# Patient Record
Sex: FEMALE | Race: WHITE | NOT HISPANIC OR LATINO | Employment: OTHER | URBAN - METROPOLITAN AREA
[De-identification: names, ages, dates, MRNs, and addresses within clinical notes are randomized per-mention and may not be internally consistent; named-entity substitution may affect disease eponyms.]

---

## 2017-03-06 ENCOUNTER — MYC MEDICAL ADVICE (OUTPATIENT)
Dept: FAMILY MEDICINE | Facility: CLINIC | Age: 47
End: 2017-03-06

## 2017-03-07 ENCOUNTER — MYC MEDICAL ADVICE (OUTPATIENT)
Dept: FAMILY MEDICINE | Facility: CLINIC | Age: 47
End: 2017-03-07

## 2017-03-07 DIAGNOSIS — T78.40XA ALLERGIC REACTION, INITIAL ENCOUNTER: Primary | ICD-10-CM

## 2017-03-07 RX ORDER — ALBUTEROL SULFATE 90 UG/1
2 AEROSOL, METERED RESPIRATORY (INHALATION) EVERY 6 HOURS PRN
Qty: 1 INHALER | Refills: 0 | Status: SHIPPED | OUTPATIENT
Start: 2017-03-07 | End: 2022-07-19

## 2017-03-07 NOTE — TELEPHONE ENCOUNTER
Message handled by Nurse Triage with [facility] - provider name: АНДРЕЙ REED   Ok for albuterol inhaler if that is what is needed.    Nila Narvaez RN    [facility]  .

## 2017-06-24 ENCOUNTER — HEALTH MAINTENANCE LETTER (OUTPATIENT)
Age: 47
End: 2017-06-24

## 2018-06-30 ENCOUNTER — HEALTH MAINTENANCE LETTER (OUTPATIENT)
Age: 48
End: 2018-06-30

## 2019-01-21 ENCOUNTER — OFFICE VISIT (OUTPATIENT)
Dept: PEDIATRICS | Facility: CLINIC | Age: 49
End: 2019-01-21
Payer: COMMERCIAL

## 2019-01-21 VITALS
HEART RATE: 64 BPM | BODY MASS INDEX: 38.67 KG/M2 | RESPIRATION RATE: 14 BRPM | WEIGHT: 211.4 LBS | SYSTOLIC BLOOD PRESSURE: 126 MMHG | DIASTOLIC BLOOD PRESSURE: 92 MMHG | TEMPERATURE: 98 F

## 2019-01-21 DIAGNOSIS — H60.542 ECZEMATOID OTITIS EXTERNA OF LEFT EAR, UNSPECIFIED CHRONICITY: Primary | ICD-10-CM

## 2019-01-21 DIAGNOSIS — L30.9 DERMATITIS: ICD-10-CM

## 2019-01-21 PROCEDURE — 99213 OFFICE O/P EST LOW 20 MIN: CPT | Performed by: INTERNAL MEDICINE

## 2019-01-21 RX ORDER — HYDROCORTISONE 2.5 %
CREAM (GRAM) TOPICAL 2 TIMES DAILY PRN
Qty: 60 G | Refills: 3 | Status: SHIPPED | OUTPATIENT
Start: 2019-01-21 | End: 2021-09-14

## 2019-01-21 RX ORDER — NEOMYCIN SULFATE, POLYMYXIN B SULFATE, HYDROCORTISONE 3.5; 10000; 1 MG/ML; [USP'U]/ML; MG/ML
3 SOLUTION/ DROPS AURICULAR (OTIC) 4 TIMES DAILY
Qty: 5 ML | Refills: 1 | Status: SHIPPED | OUTPATIENT
Start: 2019-01-21 | End: 2019-01-28

## 2019-01-21 ASSESSMENT — PATIENT HEALTH QUESTIONNAIRE - PHQ9: SUM OF ALL RESPONSES TO PHQ QUESTIONS 1-9: 0

## 2019-01-21 NOTE — PROGRESS NOTES
SUBJECTIVE:   Susan Barr is a 48 year old female who presents to clinic today for the following health issues:    Skin rash near the ear canals, pruritis in the external ear canals    Duration: Two weeks. Feels very itchy    Description  ear pain bilateral    Severity: moderate    Accompanying signs and symptoms: rash on face, neck and chest    History (predisposing factors):  Pt just returned from trip x one week ago, which sx started prior    Precipitating or alleviating factors: None    Therapies tried and outcome:  Otc Hydrocortisone cream is mildly effective    Last week developed significant redness of the right pinna and noted an enlarged area inferior to the pinna. These sx are improved.  No fevers.    Notes that hearing seems somewhat muffled over the past few days.     Problem list and histories reviewed & adjusted, as indicated.      OBJECTIVE:     BP (!) 126/92   Pulse 64   Temp 98  F (36.7  C) (Oral)   Resp 14   Wt 95.9 kg (211 lb 6.4 oz)   LMP 03/09/2007   BMI 38.67 kg/m    Body mass index is 38.67 kg/m .  GEN: no distress  SKIN: flaking skin near the auditory meatus dao. Left mid canal w/ xerotic area. Slight erythema deep on the right ear canal.  HEENT: PERRL. No nasal discharge. OP moist. TM's clear dao. Small amount of serous fluid noted on the right.   NECK: Supple      ASSESSMENT/PLAN:       ICD-10-CM    1. Eczematoid otitis externa of left ear, unspecified chronicity H60.542 neomycin-polymyxin-hydrocortisone (CORTISPORIN) [id number] otic solution   2. Dermatitis L30.9 hydrocortisone 2.5 % cream     Clinically dermatitis which is causing her sx. May have had an infection on the right last week, but seems improved. Will try cortisporin for the HC portion of the medication. HC cream to the external areas.    Patient Instructions   Cortisporin otic drops   3 drops into either or both ears up to 4 times per day   Use for 1 week, then repeat in the future if needed    Hydrocortisone 2.5%  cream   Apply twice per day to affected areas     If your symptoms do not improve, consider seeing a dermatologist     Christian Medrano MD  [facility]

## 2019-01-21 NOTE — PATIENT INSTRUCTIONS
Cortisporin otic drops   3 drops into either or both ears up to 4 times per day   Use for 1 week, then repeat in the future if needed    Hydrocortisone 2.5% cream   Apply twice per day to affected areas     If your symptoms do not improve, consider seeing a dermatologist

## 2019-04-01 ENCOUNTER — TELEPHONE (OUTPATIENT)
Dept: OTHER | Facility: CLINIC | Age: 49
End: 2019-04-01

## 2019-09-07 ENCOUNTER — E-VISIT (OUTPATIENT)
Dept: FAMILY MEDICINE | Facility: CLINIC | Age: 49
End: 2019-09-07
Payer: COMMERCIAL

## 2019-09-07 DIAGNOSIS — J06.9 ACUTE URI: Primary | ICD-10-CM

## 2019-09-07 PROCEDURE — 99444 ZZC PHYSICIAN ONLINE EVALUATION & MANAGEMENT SERVICE: CPT | Performed by: FAMILY MEDICINE

## 2019-09-07 RX ORDER — AZITHROMYCIN 250 MG/1
TABLET, FILM COATED ORAL
Qty: 6 TABLET | Refills: 0 | Status: SHIPPED | OUTPATIENT
Start: 2019-09-07 | End: 2020-10-05

## 2019-12-16 ENCOUNTER — HEALTH MAINTENANCE LETTER (OUTPATIENT)
Age: 49
End: 2019-12-16

## 2020-02-13 ENCOUNTER — TELEPHONE (OUTPATIENT)
Dept: PEDIATRICS | Facility: CLINIC | Age: 50
End: 2020-02-13

## 2020-02-13 DIAGNOSIS — Z00.00 ROUTINE HISTORY AND PHYSICAL EXAMINATION OF ADULT: Primary | ICD-10-CM

## 2020-06-15 ENCOUNTER — MYC MEDICAL ADVICE (OUTPATIENT)
Dept: FAMILY MEDICINE | Facility: CLINIC | Age: 50
End: 2020-06-15

## 2020-10-05 ENCOUNTER — E-VISIT (OUTPATIENT)
Dept: FAMILY MEDICINE | Facility: CLINIC | Age: 50
End: 2020-10-05
Payer: COMMERCIAL

## 2020-10-05 DIAGNOSIS — G44.209 TENSION HEADACHE: Primary | ICD-10-CM

## 2020-10-05 PROCEDURE — 99207 PR NO CHARGE NURSE ONLY: CPT | Performed by: FAMILY MEDICINE

## 2020-10-05 NOTE — TELEPHONE ENCOUNTER
Called # [phone]     Pt called and advised OV, Pt stated she does have appt scheduled.      Deandre Mckeon RN   [facility]

## 2020-10-05 NOTE — PATIENT INSTRUCTIONS
Thank you for choosing us for your care. Based on your symptoms and length of illness, I do not think that you need a prescription at this time.  Please follow the care advise I've provided and use the over the counter medications to help relieve your symptoms.   View your full visit summary for details by clicking on the link below.     If you're not feeling better within 2-3 days, please respond to this message and we can consider if a prescription is needed.  You can schedule an appointment right here in [facility], or call [phone]  If the visit is for the same symptoms as your e-visit, we'll refund the cost of your e-visit if seen within seven days.

## 2020-10-12 ENCOUNTER — OFFICE VISIT (OUTPATIENT)
Dept: PEDIATRICS | Facility: CLINIC | Age: 50
End: 2020-10-12
Payer: COMMERCIAL

## 2020-10-12 VITALS
OXYGEN SATURATION: 99 % | HEIGHT: 62 IN | TEMPERATURE: 98 F | WEIGHT: 243.7 LBS | RESPIRATION RATE: 16 BRPM | SYSTOLIC BLOOD PRESSURE: 141 MMHG | HEART RATE: 66 BPM | DIASTOLIC BLOOD PRESSURE: 91 MMHG | BODY MASS INDEX: 44.85 KG/M2

## 2020-10-12 DIAGNOSIS — R03.0 ELEVATED BP WITHOUT DIAGNOSIS OF HYPERTENSION: ICD-10-CM

## 2020-10-12 DIAGNOSIS — F32.9 MAJOR DEPRESSIVE DISORDER WITH CURRENT ACTIVE EPISODE, UNSPECIFIED DEPRESSION EPISODE SEVERITY, UNSPECIFIED WHETHER RECURRENT: ICD-10-CM

## 2020-10-12 DIAGNOSIS — Z12.4 PAP SMEAR FOR CERVICAL CANCER SCREENING: ICD-10-CM

## 2020-10-12 DIAGNOSIS — Z11.4 SCREENING FOR HIV (HUMAN IMMUNODEFICIENCY VIRUS): ICD-10-CM

## 2020-10-12 DIAGNOSIS — Z12.11 SCREENING FOR COLON CANCER: ICD-10-CM

## 2020-10-12 DIAGNOSIS — Z00.00 ROUTINE GENERAL MEDICAL EXAMINATION AT A HEALTH CARE FACILITY: Primary | ICD-10-CM

## 2020-10-12 DIAGNOSIS — Z11.51 SCREENING FOR HUMAN PAPILLOMAVIRUS: ICD-10-CM

## 2020-10-12 DIAGNOSIS — Z12.31 VISIT FOR SCREENING MAMMOGRAM: ICD-10-CM

## 2020-10-12 PROBLEM — E66.01 MORBID OBESITY (H): Status: ACTIVE | Noted: 2020-10-12

## 2020-10-12 LAB
ALBUMIN SERPL-MCNC: 3.6 G/DL (ref 3.4–5)
ALP SERPL-CCNC: 70 U/L (ref 40–150)
ALT SERPL W P-5'-P-CCNC: 28 U/L (ref 0–50)
ANION GAP SERPL CALCULATED.3IONS-SCNC: 4 MMOL/L (ref 3–14)
AST SERPL W P-5'-P-CCNC: 11 U/L (ref 0–45)
BILIRUB SERPL-MCNC: 0.2 MG/DL (ref 0.2–1.3)
BUN SERPL-MCNC: 15 MG/DL (ref 7–30)
CALCIUM SERPL-MCNC: 9.4 MG/DL (ref 8.5–10.1)
CHLORIDE SERPL-SCNC: 106 MMOL/L (ref 94–109)
CHOLEST SERPL-MCNC: 203 MG/DL
CO2 SERPL-SCNC: 30 MMOL/L (ref 20–32)
CREAT SERPL-MCNC: 0.71 MG/DL (ref 0.52–1.04)
GFR SERPL CREATININE-BSD FRML MDRD: >90 ML/MIN/{1.73_M2}
GLUCOSE SERPL-MCNC: 102 MG/DL (ref 70–99)
HBA1C MFR BLD: 5.6 % (ref 0–5.6)
HDLC SERPL-MCNC: 54 MG/DL
HIV 1+2 AB+HIV1 P24 AG SERPL QL IA: NONREACTIVE
LDLC SERPL CALC-MCNC: 134 MG/DL
NONHDLC SERPL-MCNC: 149 MG/DL
POTASSIUM SERPL-SCNC: 3.8 MMOL/L (ref 3.4–5.3)
PROT SERPL-MCNC: 7.9 G/DL (ref 6.8–8.8)
SODIUM SERPL-SCNC: 140 MMOL/L (ref 133–144)
TRIGL SERPL-MCNC: 73 MG/DL
TSH SERPL DL<=0.005 MIU/L-ACNC: 2.16 MU/L (ref 0.4–4)

## 2020-10-12 PROCEDURE — 99396 PREV VISIT EST AGE 40-64: CPT | Performed by: FAMILY MEDICINE

## 2020-10-12 PROCEDURE — 87624 HPV HI-RISK TYP POOLED RSLT: CPT | Performed by: FAMILY MEDICINE

## 2020-10-12 PROCEDURE — G0145 SCR C/V CYTO,THINLAYER,RESCR: HCPCS | Performed by: FAMILY MEDICINE

## 2020-10-12 PROCEDURE — 87389 HIV-1 AG W/HIV-1&-2 AB AG IA: CPT | Performed by: FAMILY MEDICINE

## 2020-10-12 PROCEDURE — 84443 ASSAY THYROID STIM HORMONE: CPT | Performed by: FAMILY MEDICINE

## 2020-10-12 PROCEDURE — 36415 COLL VENOUS BLD VENIPUNCTURE: CPT | Performed by: FAMILY MEDICINE

## 2020-10-12 PROCEDURE — 99213 OFFICE O/P EST LOW 20 MIN: CPT | Mod: 25 | Performed by: FAMILY MEDICINE

## 2020-10-12 PROCEDURE — 80053 COMPREHEN METABOLIC PANEL: CPT | Performed by: FAMILY MEDICINE

## 2020-10-12 PROCEDURE — 96127 BRIEF EMOTIONAL/BEHAV ASSMT: CPT | Performed by: FAMILY MEDICINE

## 2020-10-12 PROCEDURE — 83036 HEMOGLOBIN GLYCOSYLATED A1C: CPT | Performed by: FAMILY MEDICINE

## 2020-10-12 PROCEDURE — 80061 LIPID PANEL: CPT | Performed by: FAMILY MEDICINE

## 2020-10-12 ASSESSMENT — ENCOUNTER SYMPTOMS
HEMATURIA: 0
CHILLS: 0
EYE PAIN: 0
NERVOUS/ANXIOUS: 0
DIARRHEA: 0
COUGH: 0
ABDOMINAL PAIN: 0
CONSTIPATION: 0
DIZZINESS: 0
HEMATOCHEZIA: 0

## 2020-10-12 ASSESSMENT — PATIENT HEALTH QUESTIONNAIRE - PHQ9
SUM OF ALL RESPONSES TO PHQ QUESTIONS 1-9: 5
5. POOR APPETITE OR OVEREATING: NOT AT ALL
SUM OF ALL RESPONSES TO PHQ QUESTIONS 1-9: 5

## 2020-10-12 ASSESSMENT — ANXIETY QUESTIONNAIRES
IF YOU CHECKED OFF ANY PROBLEMS ON THIS QUESTIONNAIRE, HOW DIFFICULT HAVE THESE PROBLEMS MADE IT FOR YOU TO DO YOUR WORK, TAKE CARE OF THINGS AT HOME, OR GET ALONG WITH OTHER PEOPLE: NOT DIFFICULT AT ALL
6. BECOMING EASILY ANNOYED OR IRRITABLE: NOT AT ALL
7. FEELING AFRAID AS IF SOMETHING AWFUL MIGHT HAPPEN: NOT AT ALL
1. FEELING NERVOUS, ANXIOUS, OR ON EDGE: NOT AT ALL
GAD7 TOTAL SCORE: 0
3. WORRYING TOO MUCH ABOUT DIFFERENT THINGS: NOT AT ALL
2. NOT BEING ABLE TO STOP OR CONTROL WORRYING: NOT AT ALL
5. BEING SO RESTLESS THAT IT IS HARD TO SIT STILL: NOT AT ALL

## 2020-10-12 ASSESSMENT — MIFFLIN-ST. JEOR: SCORE: 1669.7

## 2020-10-12 NOTE — PATIENT INSTRUCTIONS
Please monitor your blood pressure, as discussed.    Follow up if your blood pressure is consistently > 140/90, as recommended.    Follow up in the ER immediately if you develop any emergent symptoms, such as:  chest pain > 5 minutes, severe/worsening headache, or other severe/emergent symptoms.      Preventive Health Recommendations  Female Ages 50 - 64    Yearly exam: See your health care provider every year in order to  o Review health changes.   o Discuss preventive care.    o Review your medicines if your doctor has prescribed any.      Get a Pap test every three years (unless you have an abnormal result and your provider advises testing more often).    If you get Pap tests with HPV test, you only need to test every 5 years, unless you have an abnormal result.     You do not need a Pap test if your uterus was removed (hysterectomy) and you have not had cancer.    You should be tested each year for STDs (sexually transmitted diseases) if you're at risk.     Have a mammogram every 1 to 2 years.    Have a colonoscopy at age 50, or have a yearly FIT test (stool test). These exams screen for colon cancer.      Have a cholesterol test every 5 years, or more often if advised.    Have a diabetes test (fasting glucose) every three years. If you are at risk for diabetes, you should have this test more often.     If you are at risk for osteoporosis (brittle bone disease), think about having a bone density scan (DEXA).    Shots: Get a flu shot each year. Get a tetanus shot every 10 years.    Nutrition:     Eat at least 5 servings of fruits and vegetables each day.    Eat whole-grain bread, whole-wheat pasta and brown rice instead of white grains and rice.    Get adequate Calcium and Vitamin D.     Lifestyle    Exercise at least 150 minutes a week (30 minutes a day, 5 days a week). This will help you control your weight and prevent disease.    Limit alcohol to one drink per day.    No smoking.     Wear sunscreen to  prevent skin cancer.     See your dentist every six months for an exam and cleaning.    See your eye doctor every 1 to 2 years.

## 2020-10-12 NOTE — PROGRESS NOTES
SUBJECTIVE:   CC: Susan Barr is an 50 year old woman who presents for preventive health visit.       Patient has been advised of split billing requirements and indicates understanding: Yes     Healthy Habits:     Getting at least 3 servings of Calcium per day:  NO    Bi-annual eye exam:  Yes    Dental care twice a year:  Yes    Sleep apnea or symptoms of sleep apnea:  None    Diet:  Regular (no restrictions)    Frequency of exercise:  None    Duration of exercise:  N/A    Taking medications regularly:  Not Applicable    Barriers to taking medications:  Not applicable    Medication side effects:  Not applicable    PHQ-2 Total Score: 2    Additional concerns today:  No    Blood pressure has been 160/90 at times (measured at home), with occasional headaches.  No chest pain or exertional symptoms.  Wears glasses, with eye exam < 1 year ago.  Patient is fasting today.    Depression symptoms have been worse in the recent past, with 40 pound weight gain reported.  Good support at home.  No severe depression symptoms or suicidal ideation.  Not taking medications currently.  Patient is interested in seeing a counselor.    Today's PHQ-2 Score:   PHQ-2 ( 1999 [employer]) 10/12/2020   Q1: Little interest or pleasure in doing things 1   Q2: Feeling down, depressed or hopeless 1   PHQ-2 Score 2   Q1: Little interest or pleasure in doing things Several days   Q2: Feeling down, depressed or hopeless Several days   PHQ-2 Score 2     PHQ-9 score:    PHQ 10/12/2020   PHQ-9 Total Score 5   Q9: Thoughts of better off dead/self-harm past 2 weeks Not at all     Abuse: Current or Past (Physical, Sexual or Emotional) - No  Do you feel safe in your environment? Yes    Social History     Tobacco Use     Smoking status: Never Smoker     Smokeless tobacco: Never Used   Substance Use Topics     Alcohol use: Yes     Comment: rare       Alcohol Use 10/12/2020   Prescreen: >3 drinks/day or >7 drinks/week? No   Prescreen: >3 drinks/day or >7  drinks/week? -     Reviewed orders with patient.  Reviewed health maintenance and updated orders accordingly - Yes  BP Readings from Last 3 Encounters:   10/12/20 (!) 141/91   01/21/19 (!) 126/92   04/08/16 128/70    Wt Readings from Last 3 Encounters:   10/12/20 110.5 kg (243 lb 11.2 oz)   01/21/19 95.9 kg (211 lb 6.4 oz)   04/08/16 108.9 kg (240 lb)            Patient Active Problem List   Diagnosis     Obesity     PCOS (polycystic ovarian syndrome)     Major depressive disorder, single episode, mild (H)     Symptomatic states associated with artificial menopause     Tension headache     GERD (gastroesophageal reflux disease)     ADD (attention deficit disorder)     CARDIOVASCULAR SCREENING; LDL GOAL LESS THAN 160     History of Lyme disease     Marcellus-Barr infection- hx of in past      Vitamin D deficiency     Symptomatic premature menopause     History of migraine with aura     Morbid obesity (H)     Past Surgical History:   Procedure Laterality Date     CHOLECYSTECTOMY, LAPOROSCOPIC  10/11    dr young     CYSTECTOMY PILONIDAL       HYSTERECTOMY, SUPRACERVICAL LAPAROSCOPIC  10/08    supracevical hysterectomy/BSO - Dr Martini     LAPAROSCOPIC CHOLECYSTECTOMY  10/18/2011    Dr Young     LAPAROSCOPIC TUBAL LIGATION  1991     TONSILLECTOMY         Social History     Tobacco Use     Smoking status: Never Smoker     Smokeless tobacco: Never Used   Substance Use Topics     Alcohol use: Yes     Comment: rare     Family History   Problem Relation Age of Onset     Lipids Mother      Hypertension Mother      Breast Cancer Mother         70's.        Alzheimer Disease Mother      Heart Disease Father         Heart disease     Diabetes Father         Adult onset     Other Cancer Father      Thyroid Disease Sister         Hypothyroidism     Mental Illness Sister      Psychotic Disorder Sister         Schizophrenia     Thyroid Disease Sister      Gastrointestinal Disease Paternal Grandfather         Kidney failure      "Diabetes Paternal Grandmother         adult onset     Hypertension Brother          Current Outpatient Medications   Medication Sig Dispense Refill     albuterol (PROAIR HFA/PROVENTIL HFA/VENTOLIN HFA) 108 (90 BASE) MCG/ACT Inhaler Inhale 2 puffs into the lungs every 6 hours as needed for shortness of breath / dyspnea or wheezing 1 Inhaler 0     hydrocortisone 2.5 % cream Apply topically 2 times daily as needed for itching 60 g 3     Allergies   Allergen Reactions     Cats Shortness Of Breath     Wheezing     No Known Drug Allergies      Adhesive Tape Rash     bandaids       Mammogram Screening: Patient over age 50, mutual decision to screen reflected in health maintenance.    Pertinent mammograms are reviewed under the imaging tab.    History of abnormal Pap smear: NO - age 30-65 PAP every 5 years with negative HPV co-testing recommended  PAP / HPV Latest Ref Rng & Units 12/22/2015 12/23/2011 4/7/2005   PAP - NIL NIL NIL   HPV 16 DNA NEG Negative - -   HPV 18 DNA NEG Negative - -   OTHER HR HPV NEG Negative - -     Reviewed and updated as needed this visit by clinical staff   Allergies  Meds    Surg Hx           Reviewed and updated as needed this visit by Provider      Review of Systems   Constitutional: Negative for chills.   HENT: Negative for congestion and ear pain.    Eyes: Negative for pain.   Respiratory: Negative for cough.    Cardiovascular: Negative for chest pain.   Gastrointestinal: Negative for abdominal pain, constipation, diarrhea and hematochezia.   Genitourinary: Negative for hematuria.   Neurological: Negative for dizziness.   Psychiatric/Behavioral: The patient is not nervous/anxious.    Occasional incontinence.  No vaginal bleeding or symptoms.     OBJECTIVE:   BP (!) 141/91   Pulse 66   Temp 98  F (36.7  C) (Oral)   Resp 16   Ht 1.568 m (5' 1.75\")   Wt 110.5 kg (243 lb 11.2 oz)   LMP 03/09/2007   SpO2 99%   BMI 44.93 kg/m       Initial blood pressure was 153/80    Physical " Exam  GENERAL: healthy, alert and no distress  EYES: Eyes grossly normal to inspection, PERRL and conjunctivae and sclerae normal  HENT: ear canals and TM's normal, nose and mouth without ulcers or lesions  NECK: no adenopathy, no asymmetry, masses, or scars and thyroid normal to palpation  RESP: lungs clear to auscultation - no rales, rhonchi or wheezes  BREAST: Discussed with and declined by patient.  CV: regular rate and rhythm, normal S1 S2, no S3 or S4, no murmur, click or rub, no peripheral edema and peripheral pulses strong  ABDOMEN: Soft, obese, nontender, no hepatosplenomegaly, no masses and bowel sounds normal.  Scars from previous surgeries are well-healed.   (female): Chaperone declined prior to exam.  Normal female external genitalia, normal urethral meatus, vaginal mucosa pink, moist, well rugated, and normal cervix without discharge.  Pap obtained without difficulty or patient discomfort.  MS: no gross musculoskeletal defects noted, no edema  SKIN: no suspicious lesions or rashes  NEURO: Normal strength and tone, mentation intact and speech normal  PSYCH: Mentation appears intact.  Affect is mildly anxious.  Patient notes depressed mood.  No suicidal ideation, homicidal ideation, or good.  See PHQ-9 above.      ASSESSMENT/PLAN:       ICD-10-CM    1. Routine general medical examination at a health care facility  Z00.00    2. Major depressive disorder with current active episode, unspecified depression episode severity, unspecified whether recurrent  F32.9 MENTAL HEALTH REFERRAL  - Adult; Outpatient Treatment; Individual/Couples/Family/Group Therapy/Health Psychology; [facility] [place]: [facility] [phone]; We will contact you to schedule the appointment or please call with any questions   3. Elevated BP without diagnosis of hypertension  R03.0 Comprehensive metabolic panel   4. BMI 40.0-44.9, adult (H)  Z68.41 **A1C FUTURE 1yr     **TSH with free T4 reflex FUTURE 1yr     Comprehensive  "metabolic panel     Lipid panel reflex to direct LDL Fasting   5. Pap smear for cervical cancer screening  Z12.4 Pap imaged thin layer screen with HPV - recommended age 30 - 65     HPV High Risk Types DNA Cervical   6. Screening for human papillomavirus  Z11.51 HPV High Risk Types DNA Cervical   7. Visit for screening mammogram  Z12.31 MA SCREENING DIGITAL BILAT - Future  (s+30)   8. Screening for HIV (human immunodeficiency virus)  Z11.4 HIV Antigen Antibody Combo   9. Screening for colon cancer  Z12.11 Fecal colorectal cancer screen (FIT)               Depression symptoms are mild.  Patient is interested in counseling.    COUNSELING:  Reviewed preventive health counseling, as reflected in patient instructions       Regular exercise       Healthy diet/nutrition       Colon cancer screening       HIV screeninx in teen years, 1x in adult years, and at intervals if high risk    Estimated body mass index is 44.93 kg/m  as calculated from the following:    Height as of this encounter: 1.568 m (5' 1.75\").    Weight as of this encounter: 110.5 kg (243 lb 11.2 oz).    Weight management plan: Discussed healthy diet and exercise guidelines  Patient is continuing to work on weight loss.    Monitor blood pressure.  Follow-up if blood pressure consistently > 140/90, as discussed.  Follow-up in 6 months to recheck weight and blood pressure.    She reports that she has never smoked. She has never used smokeless tobacco.    Influenza vaccine was discussed with and declined by patient.    Counseling Resources:  ATP IV Guidelines  Pooled Cohorts Equation Calculator  Breast Cancer Risk Calculator  BRCA-Related Cancer Risk Assessment: FHS-7 Tool  FRAX Risk Assessment  ICSI Preventive Guidelines  Dietary Guidelines for Americans, 2010  USDA's MyPlate  ASA Prophylaxis  Lung CA Screening    Fatmata Álvarez MD  [facility]  "

## 2020-10-13 ASSESSMENT — ANXIETY QUESTIONNAIRES: GAD7 TOTAL SCORE: 0

## 2020-10-15 LAB
COPATH REPORT: NORMAL
PAP: NORMAL

## 2020-10-18 LAB
FINAL DIAGNOSIS: NORMAL
HPV HR 12 DNA CVX QL NAA+PROBE: NEGATIVE
HPV16 DNA SPEC QL NAA+PROBE: NEGATIVE
HPV18 DNA SPEC QL NAA+PROBE: NEGATIVE
SPECIMEN DESCRIPTION: NORMAL
SPECIMEN SOURCE CVX/VAG CYTO: NORMAL

## 2020-10-21 ENCOUNTER — MYC MEDICAL ADVICE (OUTPATIENT)
Dept: FAMILY MEDICINE | Facility: CLINIC | Age: 50
End: 2020-10-21

## 2020-10-21 NOTE — TELEPHONE ENCOUNTER
Routing to PCP to review and advise.   Pt recently seen with fasting labs.   Need Nurse only BP check vs OV with PCP to discuss further?    Lab Results   Component Value Date    A1C 5.6 10/12/2020    A1C 5.8 04/08/2016    A1C 5.9 01/30/2009    A1C 6.2 04/07/2005     Recent Labs   Lab Test 10/12/20  0844 04/08/16  1050   CHOL 203* 210*   HDL 54 49*   * 135*   TRIG 73 131     Lab Results   Component Value Date    AST 11 10/12/2020     Lab Results   Component Value Date    ALT 28 10/12/2020     BP Readings from Last 6 Encounters:   10/12/20 (!) 141/91   01/21/19 (!) 126/92   04/08/16 128/70   02/25/16 128/82   12/22/15 130/82   03/23/12 124/86         Deandre DUFF RN   [facility]

## 2020-11-12 NOTE — TELEPHONE ENCOUNTER
Attempted to call patient to discuss MC message. No answer. Left VM stating I would reply to MC message with details and to call clinic with any questions,    See MC message sent.    Darlene CRENSHAW RN

## 2020-11-19 ENCOUNTER — VIRTUAL VISIT (OUTPATIENT)
Dept: FAMILY MEDICINE | Facility: CLINIC | Age: 50
End: 2020-11-19
Payer: COMMERCIAL

## 2020-11-19 DIAGNOSIS — I10 HYPERTENSION GOAL BP (BLOOD PRESSURE) < 140/90: ICD-10-CM

## 2020-11-19 DIAGNOSIS — Z51.81 MEDICATION MONITORING ENCOUNTER: ICD-10-CM

## 2020-11-19 DIAGNOSIS — G44.59 OTHER COMPLICATED HEADACHE SYNDROME: Primary | ICD-10-CM

## 2020-11-19 DIAGNOSIS — F32.0 MAJOR DEPRESSIVE DISORDER, SINGLE EPISODE, MILD (H): ICD-10-CM

## 2020-11-19 PROCEDURE — 99214 OFFICE O/P EST MOD 30 MIN: CPT | Mod: 95 | Performed by: FAMILY MEDICINE

## 2020-11-19 RX ORDER — ESCITALOPRAM OXALATE 10 MG/1
10 TABLET ORAL DAILY
Qty: 30 TABLET | Refills: 1 | Status: SHIPPED | OUTPATIENT
Start: 2020-11-19 | End: 2020-11-20

## 2020-11-19 RX ORDER — METOPROLOL SUCCINATE 50 MG/1
50 TABLET, EXTENDED RELEASE ORAL DAILY
Qty: 30 TABLET | Refills: 1 | Status: SHIPPED | OUTPATIENT
Start: 2020-11-19 | End: 2020-11-20

## 2020-11-19 ASSESSMENT — ANXIETY QUESTIONNAIRES
2. NOT BEING ABLE TO STOP OR CONTROL WORRYING: MORE THAN HALF THE DAYS
1. FEELING NERVOUS, ANXIOUS, OR ON EDGE: NOT AT ALL
5. BEING SO RESTLESS THAT IT IS HARD TO SIT STILL: NOT AT ALL
IF YOU CHECKED OFF ANY PROBLEMS ON THIS QUESTIONNAIRE, HOW DIFFICULT HAVE THESE PROBLEMS MADE IT FOR YOU TO DO YOUR WORK, TAKE CARE OF THINGS AT HOME, OR GET ALONG WITH OTHER PEOPLE: SOMEWHAT DIFFICULT
7. FEELING AFRAID AS IF SOMETHING AWFUL MIGHT HAPPEN: NOT AT ALL
GAD7 TOTAL SCORE: 8
3. WORRYING TOO MUCH ABOUT DIFFERENT THINGS: NEARLY EVERY DAY
6. BECOMING EASILY ANNOYED OR IRRITABLE: NOT AT ALL

## 2020-11-19 ASSESSMENT — PATIENT HEALTH QUESTIONNAIRE - PHQ9
5. POOR APPETITE OR OVEREATING: NEARLY EVERY DAY
SUM OF ALL RESPONSES TO PHQ QUESTIONS 1-9: 16

## 2020-11-19 NOTE — PROGRESS NOTES
[facility] [place]    Video visit  - [phone]    Subjective    Susan Barr is a 50 year old female who is being evaluated via a billable video visit.      Patient would like the video invitation sent by: Text to cell phone: [phone]    Chief Complaint   Patient presents with     Anxiety     Migraines better since hysterectomy - 2008    Since cholecystectomy - 2011 - random every 3 months - better with keto diet and weight loss - nausea - knocked out for the day    New HA's since September - monthly HA's - start in am - can function - thumping - worse laying down - associated with BP? 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  Headaches daily    BP Readings from Last 3 Encounters:   10/12/20 (!) 141/91   01/21/19 (!) 126/92   04/08/16 128/70     Creatinine   Date Value Ref Range Status   10/12/2020 0.71 0.52 - 1.04 mg/dL Final     GFR Estimate   Date Value Ref Range Status   10/12/2020 >90 >60 mL/min/[1.73_m2] Final     Comment:     Non  GFR Calc  Starting 12/18/2018, serum creatinine based estimated GFR (eGFR) will be   calculated using the Chronic Kidney Disease Epidemiology Collaboration   (CKD-EPI) equation.         Depression and Anxiety Follow-Up    How are you doing with your depression since your last visit? Worsened     How are you doing with your anxiety since your last visit?  No change    Are you having other symptoms that might be associated with depression or anxiety? No    Have you had a significant life event? YES     Do you have any concerns with your use of alcohol or other drugs? No    Social History     Tobacco Use     Smoking status: Never Smoker     Smokeless tobacco: Never Used   Substance Use Topics     Alcohol use: Yes     Comment: rare     Drug use: No     PHQ  10/12/2020 10/12/2020 11/19/2020   PHQ-9 Total Score 5 5 16   Q9: Thoughts of better off dead/self-harm past 2 weeks Not at all Not at all More than half the days     ANDREW-7 SCORE 12/22/2015 10/12/2020 11/19/2020   Total Score 2 0 8     Suicide Assessment Five-step Evaluation and Treatment (SAFE-T)      Reviewed and updated as needed this visit by Provider                   [facility] [place]    Past Medical History:   Diagnosis Date     ADD (attention deficit disorder)      Asthma     allergy related     Excessive menstruation     resolved with hysterectomy/bso 10/08      Gastro-oesophageal reflux disease      GERD (gastroesophageal reflux disease)      Hypertension goal BP (blood pressure) < 140/90      Hypertension goal BP (blood pressure) < 140/90      Lyme disease 2005     Major depressive disorder, single episode, mild (H) 05/2009     Obesity, unspecified      PCOS (polycystic ovarian syndrome) 2004    dr aragon/jameson - watching DM       PSH    Past Surgical History:   Procedure Laterality Date     CHOLECYSTECTOMY, LAPOROSCOPIC  10/11    dr young     CYSTECTOMY PILONIDAL       HYSTERECTOMY, SUPRACERVICAL LAPAROSCOPIC  10/08    supracevical hysterectomy/BSO - Dr Martini     LAPAROSCOPIC CHOLECYSTECTOMY  10/18/2011    Dr Young     LAPAROSCOPIC TUBAL LIGATION  1991     TONSILLECTOMY         Medications    Current Outpatient Medications   Medication Sig Dispense Refill     albuterol (PROAIR HFA/PROVENTIL HFA/VENTOLIN HFA) 108 (90 BASE) MCG/ACT Inhaler Inhale 2 puffs into the lungs every 6 hours as needed for shortness of breath / dyspnea or wheezing 1 Inhaler 0     escitalopram (LEXAPRO) 10 MG tablet Take 1 tablet (10 mg) by mouth daily 30 tablet 1     hydrocortisone 2.5 % cream Apply topically 2 times daily as needed for itching 60 g 3     metoprolol succinate ER (TOPROL-XL) 50 MG 24 hr tablet Take 1 tablet (50 mg) by mouth daily 30 tablet 1       Allergies    Cats, No known drug allergies, and Adhesive tape    Family  History    Family History   Problem Relation Age of Onset     Lipids Mother      Hypertension Mother      Breast Cancer Mother         70's.        Alzheimer Disease Mother      Heart Disease Father         Heart disease     Diabetes Father         Adult onset     Other Cancer Father      Thyroid Disease Sister         Hypothyroidism     Mental Illness Sister      Psychotic Disorder Sister         Schizophrenia     Thyroid Disease Sister      Gastrointestinal Disease Paternal Grandfather         Kidney failure     Diabetes Paternal Grandmother         adult onset     Hypertension Brother        Social History    Social History     Socioeconomic History     Marital status:      Spouse name: Not on file     Number of children: Not on file     Years of education: Not on file     Highest education level: Not on file   Occupational History     Not on file   Social Needs     Financial resource strain: Not on file     Food insecurity     Worry: Not on file     Inability: Not on file     Transportation needs     Medical: Not on file     Non-medical: Not on file   Tobacco Use     Smoking status: Never Smoker     Smokeless tobacco: Never Used   Substance and Sexual Activity     Alcohol use: Yes     Comment: rare     Drug use: No     Sexual activity: Yes     Partners: Male     Birth control/protection: Surgical     Comment: HYSTERECTOMY AND PROSTYTECTOMY   Lifestyle     Physical activity     Days per week: Not on file     Minutes per session: Not on file     Stress: Not on file   Relationships     Social connections     Talks on phone: Not on file     Gets together: Not on file     Attends Rastafari service: Not on file     Active member of club or organization: Not on file     Attends meetings of clubs or organizations: Not on file     Relationship status: Not on file     Intimate partner violence     Fear of current or ex partner: Not on file     Emotionally abused: Not on file     Physically abused: Not on file      Forced sexual activity: Not on file   Other Topics Concern     Parent/sibling w/ CABG, MI or angioplasty before 65F 55M? Yes     Comment: My Father had a heart attach in his 40s      Service Not Asked     Blood Transfusions Not Asked     Caffeine Concern Yes     Comment: 1 to 2 cups of coffee day.     Occupational Exposure Not Asked     Hobby Hazards Not Asked     Sleep Concern Yes     Comment: Excessive daytime drowsiness.     Stress Concern Not Asked     Weight Concern Not Asked     Special Diet Not Asked     Back Care Not Asked     Exercise Not Asked     Bike Helmet Not Asked     Seat Belt Not Asked     Self-Exams Yes   Social History Narrative     Not on file       Reviewed and updated as needed this visit by Provider                   Review of Systems     CONSTITUTIONAL: NEGATIVE for fever, chills, change in weight  INTEGUMENTARY/SKIN: NEGATIVE for worrisome rashes, moles or lesions  EYES: NEGATIVE for vision changes or irritation  ENT/MOUTH: NEGATIVE for ear, mouth and throat problems  RESP: NEGATIVE for significant cough or SOB  CV: NEGATIVE for chest pain, palpitations or peripheral edema  GI: NEGATIVE for nausea, abdominal pain, heartburn, or change in bowel habits  : NEGATIVE for frequency, dysuria, or hematuria  MUSCULOSKELETAL: NEGATIVE for significant arthralgias or myalgia  NEURO: NEGATIVE for weakness, dizziness or paresthesias  ENDOCRINE: NEGATIVE for temperature intolerance, skin/hair changes  HEME: NEGATIVE for bleeding problems  PSYCHIATRIC: NEGATIVE for changes in mood or affect    Objective    Reported vitals:  LMP 03/09/2007      healthy, alert and no distress  Psych: Alert and oriented times 3; coherent speech, normal   rate and volume, able to articulate logical thoughts, able   to abstract reason, no tangential thoughts, no hallucinations   or delusions  Her affect is normal     GENERAL: Healthy, alert and no distress  EYES: Eyes grossly normal to inspection.  No discharge or  "erythema, or obvious scleral/conjunctival abnormalities.  RESP: No audible wheeze, cough, or visible cyanosis.  No visible retractions or increased work of breathing.    SKIN: Visible skin clear. No significant rash, abnormal pigmentation or lesions.  NEURO: Cranial nerves grossly intact.  Mentation and speech appropriate for age.  PSYCH: Mentation appears normal, affect normal/bright, judgement and insight intact, normal speech and appearance well-groomed.    Diagnostic Test Results:  Labs reviewed in Epic    Assessment/Plan:      ICD-10-CM    1. Other complicated headache syndrome  G44.59 NEUROLOGY ADULT REFERRAL     escitalopram (LEXAPRO) 10 MG tablet   2. Hypertension goal BP (blood pressure) < 140/90  I10 metoprolol succinate ER (TOPROL-XL) 50 MG 24 hr tablet   3. Major depressive disorder, single episode, mild (H)  F32.0    4. Medication monitoring encounter  Z51.81      Lexapro 10 mg daily   Headache consult - Dr Castillo  Metoprolol 50 mg daily  HA diary  Consider Psychology/psychiatry    Return in about 3 weeks (around 12/10/2020), or if symptoms worsen or fail to improve, for Medication Recheck Visit, Follow Up Acute, Follow Up Chronic.    Video-Visit Details    Type of service:  Video Visit    Video Start Time: 12:20 pm  Video End Time (time video stopped): 12:55 pm    Originating Location (pt. Location): Home    Distant Location (provider location):  [facility]     Mode of Communication:  Video Conference via [employer]    The patient has been notified of following:     \"This video visit will be conducted via a call between you and your physician/provider. We have found that certain health care needs can be provided without the need for an in-person physical exam.  This service lets us provide the care you need with a video conversation.  If a prescription is necessary we can send it directly to your pharmacy.  If lab work is needed we can place an order for that and you can then " "stop by our lab to have the test done at a later time.    If during the course of the call the physician/provider feels a video visit is not appropriate, you will not be charged for this service.\"     Patient has given verbal consent for Video visit? Yes             Devon Syed MD, FAA[facility] Municipal [facility]  [address] [place] [id number]  [email or web address]   Office: [phone]  Fax: [phone]  Pager: [phone]     "

## 2020-11-20 ENCOUNTER — TELEPHONE (OUTPATIENT)
Dept: FAMILY MEDICINE | Facility: CLINIC | Age: 50
End: 2020-11-20

## 2020-11-20 DIAGNOSIS — I10 HYPERTENSION GOAL BP (BLOOD PRESSURE) < 140/90: ICD-10-CM

## 2020-11-20 DIAGNOSIS — G44.59 OTHER COMPLICATED HEADACHE SYNDROME: ICD-10-CM

## 2020-11-20 RX ORDER — ESCITALOPRAM OXALATE 10 MG/1
10 TABLET ORAL DAILY
Qty: 90 TABLET | Refills: 0 | Status: SHIPPED | OUTPATIENT
Start: 2020-11-20 | End: 2020-12-15

## 2020-11-20 RX ORDER — METOPROLOL SUCCINATE 50 MG/1
50 TABLET, EXTENDED RELEASE ORAL DAILY
Qty: 90 TABLET | Refills: 0 | Status: SHIPPED | OUTPATIENT
Start: 2020-11-20 | End: 2021-02-15

## 2020-11-20 ASSESSMENT — ANXIETY QUESTIONNAIRES: GAD7 TOTAL SCORE: 8

## 2020-11-20 NOTE — TELEPHONE ENCOUNTER
After reviewing chart, a 90 day supply was sent.  Patient has picked up prescriptions already.    ADELINE StoverN, RN  [employer] [facility]

## 2020-11-20 NOTE — TELEPHONE ENCOUNTER
General Call:     Who is calling:  Sharon    Reason for Call:  Sharon is calling saying [facility] in [place] won't fill escitalopram or metoprolol succinate for 30 days. They want to fill it for 90. She is hoping the nurse can work with the pharmacy to get this changed.    Okay to leave a detailed message:Yes at Home number on file [phone] (home)

## 2020-11-20 NOTE — TELEPHONE ENCOUNTER
Note from [facility] pharmacy, [place]     Rx's for Escitalopram 10mg and Metoprolol ER 50mg were given for 30 days since they were new medications for the patient however insurance requires 90 day supply    Per Dr. Kunal olson to fill for 90 day supply

## 2020-11-20 NOTE — TELEPHONE ENCOUNTER
Rx's changed to 90 days without refills. Will monitor medication efficacy and refill as needed.    Deandre DUFF RN   [facility]

## 2020-12-14 ASSESSMENT — ANXIETY QUESTIONNAIRES
7. FEELING AFRAID AS IF SOMETHING AWFUL MIGHT HAPPEN: NOT AT ALL
2. NOT BEING ABLE TO STOP OR CONTROL WORRYING: NOT AT ALL
4. TROUBLE RELAXING: NOT AT ALL
5. BEING SO RESTLESS THAT IT IS HARD TO SIT STILL: NOT AT ALL
GAD7 TOTAL SCORE: 0
7. FEELING AFRAID AS IF SOMETHING AWFUL MIGHT HAPPEN: NOT AT ALL
3. WORRYING TOO MUCH ABOUT DIFFERENT THINGS: NOT AT ALL
GAD7 TOTAL SCORE: 0
1. FEELING NERVOUS, ANXIOUS, OR ON EDGE: NOT AT ALL
6. BECOMING EASILY ANNOYED OR IRRITABLE: NOT AT ALL
GAD7 TOTAL SCORE: 0

## 2020-12-14 ASSESSMENT — PATIENT HEALTH QUESTIONNAIRE - PHQ9
10. IF YOU CHECKED OFF ANY PROBLEMS, HOW DIFFICULT HAVE THESE PROBLEMS MADE IT FOR YOU TO DO YOUR WORK, TAKE CARE OF THINGS AT HOME, OR GET ALONG WITH OTHER PEOPLE: SOMEWHAT DIFFICULT
SUM OF ALL RESPONSES TO PHQ QUESTIONS 1-9: 2
SUM OF ALL RESPONSES TO PHQ QUESTIONS 1-9: 2

## 2020-12-14 NOTE — PROGRESS NOTES
[facility]  [place]    SUBJECTIVE    [facility] - [place]    [facility] [phone]    Subjective    Susan Barr is a 50 year old female who is being evaluated via a billable video visit.      Patient would like the video invitation sent by: Text to cell phone:      Chief Complaint   Patient presents with     Recheck Medication     Lexapro has been very helpful - fatigue noted - now taking at night    Migraines - worse with chocolate - keeping HA diary - has referral to Dr Castillo     Has been dieting/losing weight/exercising    Htn - metoprolol    BP Readings from Last 3 Encounters:   12/15/20 128/86   10/12/20 (!) 141/91   01/21/19 (!) 126/92     Creatinine   Date Value Ref Range Status   10/12/2020 0.71 0.52 - 1.04 mg/dL Final     PHQ 10/12/2020 11/19/2020 12/14/2020   PHQ-9 Total Score 5 16 2   Q9: Thoughts of better off dead/self-harm past 2 weeks Not at all More than half the days Not at all     ANDREW-7 SCORE 10/12/2020 11/19/2020 12/14/2020   Total Score - - 0 (minimal anxiety)   Total Score 0 8 0     11/19    Migraines better since hysterectomy - 2008     Since cholecystectomy - 2011 - random every 3 months - better with keto diet and weight loss - nausea - knocked out for the day     New HA's since September - monthly HA's - start in am - can function - thumping - worse laying down - associated with BP? 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  Headaches daily         BP Readings from Last 3 Encounters:   10/12/20 (!) 141/91   01/21/19 (!) 126/92   04/08/16 128/70            Creatinine   Date Value Ref Range Status   10/12/2020 0.71 0.52 - 1.04 mg/dL Final              GFR Estimate   Date Value Ref Range Status   10/12/2020 >90 >60 mL/min/[1.73_m2] Final       Comment:       Non   GFR Calc  Starting 12/18/2018, serum creatinine based estimated GFR (eGFR) will be   calculated using the Chronic Kidney Disease Epidemiology Collaboration   (CKD-EPI) equation.           Depression and Anxiety Follow-Up    How are you doing with your depression since your last visit? Worsened     How are you doing with your anxiety since your last visit?  No change    Are you having other symptoms that might be associated with depression or anxiety? No    Have you had a significant life event? YES       Do you have any concerns with your use of alcohol or other drugs? No    Social History     Tobacco Use     Smoking status: Never Smoker     Smokeless tobacco: Never Used   Substance Use Topics     Alcohol use: Yes     Comment: rare     Drug use: No     Suicide Assessment Five-step Evaluation and Treatment (SAFE-T)      Reviewed and updated as needed this visit by Provider    Answers for HPI/ROS submitted by the patient on 12/14/2020   Chronic problems general questions HPI Form  If you checked off any problems, how difficult have these problems made it for you to do your work, take care of things at home, or get along with other people?: Somewhat difficult  PHQ9 TOTAL SCORE: 2  ANDREW 7 TOTAL SCORE: 0  How many servings of fruits and vegetables do you eat daily?: 2-3  On average, how many sweetened beverages do you drink each day (Examples: soda, juice, sweet tea, etc.  Do NOT count diet or artificially sweetened beverages)?: 0  How many minutes a day do you exercise enough to make your heart beat faster?: 9 or less  How many days a week do you exercise enough to make your heart beat faster?: 3 or less  How many days per week do you miss taking your medication?: 0  Depression/Anxiety: Depression & Anxiety  Status since last visit:: better  Anxiety since last: : better  Other associated symptoms of depression:: No  Other associated symotome: : No  Significant life event: : No  Anxious:: No  Current  substance use:: No  Do you check your blood pressure regularly outside of the clinic?: Yes  Are your blood pressures ever more than 140 on the top number (systolic) OR more than 90 on the bottom number (diastolic)? (For example, greater than 140/90): Yes  Are you following a low salt diet?: Yes    PMH    Past Medical History:   Diagnosis Date     ADD (attention deficit disorder)      Anxiety 12/15/2020     Asthma     allergy related     Excessive menstruation     resolved with hysterectomy/bso 10/08      Gastro-oesophageal reflux disease      GERD (gastroesophageal reflux disease)      Hypertension goal BP (blood pressure) < 140/90      Hypertension goal BP (blood pressure) < 140/90      Lyme disease 2005     Major depressive disorder, single episode, mild (H) 05/2009     Obesity, unspecified      PCOS (polycystic ovarian syndrome) 2004    dr aragon/jameson - watching DM       PSH    Past Surgical History:   Procedure Laterality Date     CHOLECYSTECTOMY, LAPOROSCOPIC  10/11    dr young     CYSTECTOMY PILONIDAL       HYSTERECTOMY, SUPRACERVICAL LAPAROSCOPIC  10/08    supracevical hysterectomy/BSO - Dr Martini     LAPAROSCOPIC CHOLECYSTECTOMY  10/18/2011    Dr Young     LAPAROSCOPIC TUBAL LIGATION  1991     TONSILLECTOMY         Medications    Current Outpatient Medications   Medication Sig Dispense Refill     escitalopram (LEXAPRO) 10 MG tablet Take 1 tablet (10 mg) by mouth daily 90 tablet 3     metoprolol succinate ER (TOPROL-XL) 50 MG 24 hr tablet Take 1 tablet (50 mg) by mouth daily 90 tablet 0     albuterol (PROAIR HFA/PROVENTIL HFA/VENTOLIN HFA) 108 (90 BASE) MCG/ACT Inhaler Inhale 2 puffs into the lungs every 6 hours as needed for shortness of breath / dyspnea or wheezing 1 Inhaler 0     hydrocortisone 2.5 % cream Apply topically 2 times daily as needed for itching 60 g 3       Allergies    Cats, No known drug allergies, and Adhesive tape    Family History    Family History   Problem Relation Age of Onset      Lipids Mother      Hypertension Mother      Breast Cancer Mother         70's.        Alzheimer Disease Mother      Hyperlipidemia Mother      Heart Disease Father         Heart disease     Diabetes Father         Adult onset     Other Cancer Father         Lung Cancer/smoker     Obesity Father      Thyroid Disease Sister         Hypothyroidism     Mental Illness Sister      Psychotic Disorder Sister         Schizophrenia     Thyroid Disease Sister      Gastrointestinal Disease Paternal Grandfather         Kidney failure     Diabetes Paternal Grandmother         adult onset     Hypertension Brother        Social History    Social History     Socioeconomic History     Marital status:      Spouse name: Not on file     Number of children: Not on file     Years of education: Not on file     Highest education level: Not on file   Occupational History     Not on file   Social Needs     Financial resource strain: Not on file     Food insecurity     Worry: Not on file     Inability: Not on file     Transportation needs     Medical: Not on file     Non-medical: Not on file   Tobacco Use     Smoking status: Never Smoker     Smokeless tobacco: Never Used   Substance and Sexual Activity     Alcohol use: Yes     Comment: rare     Drug use: No     Sexual activity: Yes     Partners: Male     Birth control/protection: Surgical     Comment: HYSTERECTOMY AND PROSTYTECTOMY   Lifestyle     Physical activity     Days per week: Not on file     Minutes per session: Not on file     Stress: Not on file   Relationships     Social connections     Talks on phone: Not on file     Gets together: Not on file     Attends Muslim service: Not on file     Active member of club or organization: Not on file     Attends meetings of clubs or organizations: Not on file     Relationship status: Not on file     Intimate partner violence     Fear of current or ex partner: Not on file     Emotionally abused: Not on file     Physically abused:  Not on file     Forced sexual activity: Not on file   Other Topics Concern     Parent/sibling w/ CABG, MI or angioplasty before 65F 55M? Yes     Comment: My Father had a heart attach in his 40s      Service Not Asked     Blood Transfusions Not Asked     Caffeine Concern Yes     Comment: 1 to 2 cups of coffee day.     Occupational Exposure Not Asked     Hobby Hazards Not Asked     Sleep Concern Yes     Comment: Excessive daytime drowsiness.     Stress Concern Not Asked     Weight Concern Not Asked     Special Diet Not Asked     Back Care Not Asked     Exercise Not Asked     Bike Helmet Not Asked     Seat Belt Not Asked     Self-Exams Yes   Social History Narrative     Not on file       Reviewed and updated as needed this visit by Provider                   Review of Systems     CONSTITUTIONAL: NEGATIVE for fever, chills, change in weight  INTEGUMENTARY/SKIN: NEGATIVE for worrisome rashes, moles or lesions  EYES: NEGATIVE for vision changes or irritation  ENT/MOUTH: NEGATIVE for ear, mouth and throat problems  RESP: NEGATIVE for significant cough or SOB  CV: NEGATIVE for chest pain, palpitations or peripheral edema  GI: NEGATIVE for nausea, abdominal pain, heartburn, or change in bowel habits  : NEGATIVE for frequency, dysuria, or hematuria  MUSCULOSKELETAL: NEGATIVE for significant arthralgias or myalgia  NEURO: NEGATIVE for weakness, dizziness or paresthesias  ENDOCRINE: NEGATIVE for temperature intolerance, skin/hair changes  HEME: NEGATIVE for bleeding problems  PSYCHIATRIC: NEGATIVE for changes in mood or affect    Objective    Reported vitals:  /86   LMP 03/09/2007      healthy, alert and no distress  Psych: Alert and oriented times 3; coherent speech, normal   rate and volume, able to articulate logical thoughts, able   to abstract reason, no tangential thoughts, no hallucinations   or delusions  Her affect is normal     GENERAL: Healthy, alert and no distress  EYES: Eyes grossly normal to  "inspection.  No discharge or erythema, or obvious scleral/conjunctival abnormalities.  RESP: No audible wheeze, cough, or visible cyanosis.  No visible retractions or increased work of breathing.    SKIN: Visible skin clear. No significant rash, abnormal pigmentation or lesions.  NEURO: Cranial nerves grossly intact.  Mentation and speech appropriate for age.  PSYCH: Mentation appears normal, affect normal/bright, judgement and insight intact, normal speech and appearance well-groomed.    Diagnostic Test Results:  Labs reviewed in Epic    Assessment/Plan:      ICD-10-CM    1. Major depressive disorder, single episode, mild (H)  F32.0    2. Anxiety  F41.9    3. Migraine with aura and without status migrainosus, not intractable  G43.109    4. Tension headache  G44.209    5. Hypertension goal BP (blood pressure) < 140/90  I10    6. Attention deficit disorder, unspecified hyperactivity presence  F98.8    7. Medication monitoring encounter  Z51.81        [facility] diary/Dr Castillo  Metoprolol, watch BP, may need to adjust  Continue lexapro, refilled, hold on psychology/psychiatry    Return in about 6 weeks (around 1/26/2021) for Medication Recheck Visit, Follow Up Chronic.    Video-Visit Details    Type of service:  Video Visit    Video Start Time: 8:45 am  Video End Time (time video stopped): 9:10 am    Originating Location (pt. Location): Home    Distant Location (provider location):  [facility]     Mode of Communication:  Video Conference via Doximity             Devon Syed MD, FAAFP     [facility]  [address] [place] [id number]  [email or web address]   Office: [phone]  Fax: [phone]  Pager: [phone]     The patient has been notified of following:     \"This video visit will be conducted via a call between you and your physician/provider. We have found that certain health care needs can be " "provided without the need for an in-person physical exam.  This service lets us provide the care you need with a video conversation.  If a prescription is necessary we can send it directly to your pharmacy.  If lab work is needed we can place an order for that and you can then stop by our lab to have the test done at a later time.    Video visits are billed at different rates depending on your insurance coverage.  Please reach out to your insurance provider with any questions.    If during the course of the call the physician/provider feels a video visit is not appropriate, you will not be charged for this service.\"    Patient has given verbal consent for Video visit? Yes  How would you like to obtain your AVS? Mychart  If you are dropped from the video visit, the video invite should be resent to: Text to cell phone:    Will anyone else be joining your video visit? No    If patient encounters technical issues they should call [phone] :[phone]}    "

## 2020-12-15 ENCOUNTER — VIRTUAL VISIT (OUTPATIENT)
Dept: FAMILY MEDICINE | Facility: CLINIC | Age: 50
End: 2020-12-15
Payer: COMMERCIAL

## 2020-12-15 VITALS — SYSTOLIC BLOOD PRESSURE: 128 MMHG | DIASTOLIC BLOOD PRESSURE: 86 MMHG

## 2020-12-15 DIAGNOSIS — F32.0 MAJOR DEPRESSIVE DISORDER, SINGLE EPISODE, MILD (H): Primary | ICD-10-CM

## 2020-12-15 DIAGNOSIS — G43.109 MIGRAINE WITH AURA AND WITHOUT STATUS MIGRAINOSUS, NOT INTRACTABLE: ICD-10-CM

## 2020-12-15 DIAGNOSIS — I10 HYPERTENSION GOAL BP (BLOOD PRESSURE) < 140/90: ICD-10-CM

## 2020-12-15 DIAGNOSIS — F41.9 ANXIETY: ICD-10-CM

## 2020-12-15 DIAGNOSIS — F98.8 ATTENTION DEFICIT DISORDER, UNSPECIFIED HYPERACTIVITY PRESENCE: ICD-10-CM

## 2020-12-15 DIAGNOSIS — Z51.81 MEDICATION MONITORING ENCOUNTER: ICD-10-CM

## 2020-12-15 DIAGNOSIS — G44.209 TENSION HEADACHE: ICD-10-CM

## 2020-12-15 PROCEDURE — 99214 OFFICE O/P EST MOD 30 MIN: CPT | Mod: 95 | Performed by: FAMILY MEDICINE

## 2020-12-15 RX ORDER — ESCITALOPRAM OXALATE 10 MG/1
10 TABLET ORAL DAILY
Qty: 90 TABLET | Refills: 3 | Status: SHIPPED | OUTPATIENT
Start: 2020-12-15 | End: 2021-09-14

## 2020-12-15 ASSESSMENT — PATIENT HEALTH QUESTIONNAIRE - PHQ9: SUM OF ALL RESPONSES TO PHQ QUESTIONS 1-9: 2

## 2020-12-15 ASSESSMENT — ANXIETY QUESTIONNAIRES: GAD7 TOTAL SCORE: 0

## 2021-01-15 ENCOUNTER — HEALTH MAINTENANCE LETTER (OUTPATIENT)
Age: 51
End: 2021-01-15

## 2021-01-15 ENCOUNTER — MYC MEDICAL ADVICE (OUTPATIENT)
Dept: FAMILY MEDICINE | Facility: CLINIC | Age: 51
End: 2021-01-15

## 2021-01-15 DIAGNOSIS — I10 HYPERTENSION GOAL BP (BLOOD PRESSURE) < 140/90: Primary | ICD-10-CM

## 2021-01-15 RX ORDER — IRBESARTAN AND HYDROCHLOROTHIAZIDE 150; 12.5 MG/1; MG/1
1 TABLET, FILM COATED ORAL DAILY
Qty: 30 TABLET | Refills: 1 | Status: CANCELLED | OUTPATIENT
Start: 2021-01-15

## 2021-01-15 NOTE — TELEPHONE ENCOUNTER
Blake sent    Rx pended - will update med list if patient in agreement      Wandy Holden RN  [facility]

## 2021-01-15 NOTE — TELEPHONE ENCOUNTER
BP Readings from Last 3 Encounters:   12/15/20 128/86   10/12/20 (!) 141/91   01/21/19 (!) 126/92     MyChart sent  Awaiting response      Wandy Holden RN  [facility]

## 2021-01-15 NOTE — TELEPHONE ENCOUNTER
Ok to discontinue metoprolol, trial of irbesartan 150 mg/12.5 mg hydrochlorothiazide, 1 daily (#30, r x1), follow up 2 weeks with phone visit(has been video) to review meds/BP, will need CMP    BP Readings from Last 3 Encounters:   12/15/20 128/86   10/12/20 (!) 141/91   01/21/19 (!) 126/92       Creatinine   Date Value Ref Range Status   10/12/2020 0.71 0.52 - 1.04 mg/dL Final

## 2021-01-15 NOTE — LETTER
[address], [place] [address] [phone]        January 20, 2021    Susan Barr                                                                                                                                                        [address] [place] [address]              Dear Susan,    We have been unsuccessful in our attempts to reach you by phone.  Please call us at the [facility] [phone]   between the hours of 8:00am - 5:00 pm, Monday through Friday.        Sincerely,                Devon Syed M.D./DIDI

## 2021-01-15 NOTE — TELEPHONE ENCOUNTER
Routing to PCP for further review/recommendations/orders.    Wandy Holden RN  [facility]

## 2021-01-19 NOTE — TELEPHONE ENCOUNTER
Attempt # 2  Called # [phone]   Pt has read frents message     Left a non detailed VM to call back at [phone] and ask for any available Triage Nurse.    Deandre Mckeon RN   [facility]

## 2021-01-20 NOTE — TELEPHONE ENCOUNTER
Attempt # 3  Called # [phone]     Letter sent      Left a non detailed VM to call back at [phone] and ask for any available Triage Nurse.    Deandre Mckeon RN   [facility]

## 2021-02-15 DIAGNOSIS — I10 HYPERTENSION GOAL BP (BLOOD PRESSURE) < 140/90: ICD-10-CM

## 2021-02-15 RX ORDER — METOPROLOL SUCCINATE 50 MG/1
TABLET, EXTENDED RELEASE ORAL
Qty: 90 TABLET | Refills: 1 | Status: SHIPPED | OUTPATIENT
Start: 2021-02-15 | End: 2021-09-14

## 2021-04-22 ENCOUNTER — IMMUNIZATION (OUTPATIENT)
Dept: NURSING | Facility: CLINIC | Age: 51
End: 2021-04-22
Payer: COMMERCIAL

## 2021-04-22 PROCEDURE — 0011A PR COVID VAC MODERNA 100 MCG/0.5 ML IM: CPT

## 2021-04-22 PROCEDURE — 91301 PR COVID VAC MODERNA 100 MCG/0.5 ML IM: CPT

## 2021-05-20 ENCOUNTER — IMMUNIZATION (OUTPATIENT)
Dept: NURSING | Facility: CLINIC | Age: 51
End: 2021-05-20
Payer: COMMERCIAL

## 2021-05-20 PROCEDURE — 91301 PR COVID VAC MODERNA 100 MCG/0.5 ML IM: CPT

## 2021-05-20 PROCEDURE — 0012A PR COVID VAC MODERNA 100 MCG/0.5 ML IM: CPT

## 2021-09-02 ENCOUNTER — ANCILLARY PROCEDURE (OUTPATIENT)
Dept: GENERAL RADIOLOGY | Facility: CLINIC | Age: 51
End: 2021-09-02
Attending: NURSE PRACTITIONER
Payer: COMMERCIAL

## 2021-09-02 ENCOUNTER — OFFICE VISIT (OUTPATIENT)
Dept: URGENT CARE | Facility: URGENT CARE | Age: 51
End: 2021-09-02
Payer: COMMERCIAL

## 2021-09-02 VITALS
RESPIRATION RATE: 18 BRPM | DIASTOLIC BLOOD PRESSURE: 95 MMHG | TEMPERATURE: 98 F | SYSTOLIC BLOOD PRESSURE: 155 MMHG | HEART RATE: 74 BPM | OXYGEN SATURATION: 98 %

## 2021-09-02 DIAGNOSIS — M79.672 LEFT FOOT PAIN: ICD-10-CM

## 2021-09-02 DIAGNOSIS — M25.561 ACUTE PAIN OF RIGHT KNEE: ICD-10-CM

## 2021-09-02 DIAGNOSIS — M25.561 ACUTE PAIN OF RIGHT KNEE: Primary | ICD-10-CM

## 2021-09-02 PROCEDURE — 73630 X-RAY EXAM OF FOOT: CPT | Mod: 59 | Performed by: RADIOLOGY

## 2021-09-02 PROCEDURE — 99214 OFFICE O/P EST MOD 30 MIN: CPT | Performed by: NURSE PRACTITIONER

## 2021-09-02 PROCEDURE — 73562 X-RAY EXAM OF KNEE 3: CPT | Mod: RT | Performed by: RADIOLOGY

## 2021-09-02 ASSESSMENT — ENCOUNTER SYMPTOMS
COLOR CHANGE: 0
MYALGIAS: 1
ACTIVITY CHANGE: 0
NAUSEA: 0
FATIGUE: 0
PALPITATIONS: 0
APPETITE CHANGE: 0
VOMITING: 0
JOINT SWELLING: 1
CHILLS: 0
FEVER: 0
WEAKNESS: 0
ABDOMINAL PAIN: 0
PARESTHESIAS: 0
DIAPHORESIS: 0
NUMBNESS: 0
SLEEP DISTURBANCE: 0
WOUND: 0
ARTHRALGIAS: 1

## 2021-09-02 NOTE — PROGRESS NOTES
Chief Complaint   Patient presents with     Urgent Care     Musculoskeletal Problem     R knee pain know injury X1 wk      SUBJECTIVE:  Susan Barr is a 51 year old female who presents to the clinic today with foot and knee pain. Started with foot pain, first metatarsal for months, worried about stress fracture, hurts to bear weight. Feels like she is compensating and now right knee is injured in the past week. Her kneecap popped out and moved forward. She has residual pressure, swelling, stretching sensation on flexion. Known degenerative cartilage condition.    Past Medical History:   Diagnosis Date     ADD (attention deficit disorder)      Anxiety 12/15/2020     Asthma     allergy related     Excessive menstruation     resolved with hysterectomy/bso 10/08      Gastro-oesophageal reflux disease      GERD (gastroesophageal reflux disease)      Hypertension goal BP (blood pressure) < 140/90      Hypertension goal BP (blood pressure) < 140/90      Lyme disease 2005     Major depressive disorder, single episode, mild (H) 05/2009     Obesity, unspecified      PCOS (polycystic ovarian syndrome) 2004    dr aragon/rashaadr - watching DM     albuterol (PROAIR HFA/PROVENTIL HFA/VENTOLIN HFA) 108 (90 BASE) MCG/ACT Inhaler, Inhale 2 puffs into the lungs every 6 hours as needed for shortness of breath / dyspnea or wheezing  escitalopram (LEXAPRO) 10 MG tablet, Take 1 tablet (10 mg) by mouth daily  hydrocortisone 2.5 % cream, Apply topically 2 times daily as needed for itching  metoprolol succinate ER (TOPROL-XL) 50 MG 24 hr tablet, TAKE 1 TABLET BY MOUTH EVERY DAY    No current facility-administered medications on file prior to visit.    Social History     Tobacco Use     Smoking status: Never Smoker     Smokeless tobacco: Never Used   Substance Use Topics     Alcohol use: Yes     Comment: rare     Allergies   Allergen Reactions     Cats Shortness Of Breath     Wheezing     No Known Drug Allergies      Adhesive Tape Rash      bandaids     Review of Systems   Constitutional: Negative for activity change, appetite change, chills, diaphoresis, fatigue and fever.   Cardiovascular: Negative for chest pain, palpitations and peripheral edema.   Gastrointestinal: Negative for abdominal pain, nausea and vomiting.   Musculoskeletal: Positive for arthralgias, gait problem, joint swelling and myalgias.   Skin: Negative for color change, rash and wound.   Neurological: Negative for weakness, numbness and paresthesias.   Psychiatric/Behavioral: Negative for sleep disturbance.     EXAM:   BP (!) 155/95   Pulse 74   Temp 98  F (36.7  C)   Resp 18   LMP 03/09/2007   SpO2 98%     Physical Exam  Vitals reviewed.   Constitutional:       Appearance: Normal appearance.   HENT:      Head: Normocephalic and atraumatic.   Cardiovascular:      Rate and Rhythm: Normal rate.   Pulmonary:      Effort: Pulmonary effort is normal.   Musculoskeletal:         General: Swelling (not obvious), tenderness and signs of injury present. Normal range of motion.      Comments: Right knee tenderness bilateral sides, left foot 1st metatarsal tenderness.   Skin:     General: Skin is warm and dry.      Findings: No bruising, erythema, lesion or rash.   Neurological:      General: No focal deficit present.      Mental Status: She is alert and oriented to person, place, and time.   Psychiatric:         Mood and Affect: Mood normal.         Behavior: Behavior normal.       Xray done in clinic read by me as negative for fracture or dislocation. Positive for knee joint space narrowing.  Results for orders placed or performed in visit on 09/02/21   XR Foot Left G/E 3 Views     Status: None    Narrative    XR LEFT FOOT THREE OR MORE VIEWS   9/2/2021 4:22 PM     HISTORY:  5th metatarsal pain for months, worried about stress  fracture, hurts to bear weight. Left foot pain.    FINDINGS: Calcaneal spurring. Otherwise unremarkable.      Impression    IMPRESSION: No fracture  identified.    YINA PUTNAM MD         SYSTEM ID:  [facility]   Results for orders placed or performed in visit on 09/02/21   XR Knee Right 3 Views     Status: None    Narrative    XR RIGHT KNEE THREE VIEWS   9/2/2021 4:26 PM     HISTORY: Right knee pain, swelling, pressure up thigh when flexing,  history of degenerative cartilage, wobbly knees. Acute pain of right  knee.      Impression    IMPRESSION: Three-compartment osteophytosis. I suspect mild medial  compartment narrowing. This could be further evaluated with bilateral  Melendrez view if indicated clinically. Minimal effusion. No fracture  identified.     YINA PUTNAM MD         SYSTEM ID:  [facility]     ASSESSMENT:    ICD-10-CM    1. Acute pain of right knee  M25.561 XR Knee Right 3 Views     Orthopedic  Referral   2. Left foot pain  M79.672 XR Foot Left G/E 3 Views     PLAN:  Patient Instructions   Xrays negative for fracture or dislocation, positive for knee joint space narrowing  Rest, ice, heat, compression, elevate  Rotate tylenol and ibuprofen  Ortho referral placed to discuss further eval and management    Follow up with primary care provider with any problems, questions or concerns or if symptoms worsen or fail to improve. Patient agreed to plan and verbalized understanding.    Krista Pedersen, GONZALO-BC  [facility]

## 2021-09-03 NOTE — PATIENT INSTRUCTIONS
Xrays negative for fracture or dislocation, positive for knee joint space narrowing  Rest, ice, heat, compression, elevate  Rotate tylenol and ibuprofen  Ortho referral placed to discuss further eval and management

## 2021-09-04 ENCOUNTER — HEALTH MAINTENANCE LETTER (OUTPATIENT)
Age: 51
End: 2021-09-04

## 2021-09-14 ENCOUNTER — ANCILLARY PROCEDURE (OUTPATIENT)
Dept: GENERAL RADIOLOGY | Facility: CLINIC | Age: 51
End: 2021-09-14
Attending: ORTHOPAEDIC SURGERY
Payer: COMMERCIAL

## 2021-09-14 ENCOUNTER — OFFICE VISIT (OUTPATIENT)
Dept: ORTHOPEDICS | Facility: CLINIC | Age: 51
End: 2021-09-14
Attending: NURSE PRACTITIONER
Payer: COMMERCIAL

## 2021-09-14 VITALS
HEIGHT: 62 IN | DIASTOLIC BLOOD PRESSURE: 88 MMHG | BODY MASS INDEX: 44.72 KG/M2 | SYSTOLIC BLOOD PRESSURE: 138 MMHG | WEIGHT: 243 LBS

## 2021-09-14 DIAGNOSIS — M17.11 PRIMARY LOCALIZED OSTEOARTHRITIS OF RIGHT KNEE: Primary | ICD-10-CM

## 2021-09-14 DIAGNOSIS — M25.561 ACUTE PAIN OF RIGHT KNEE: ICD-10-CM

## 2021-09-14 PROCEDURE — 73502 X-RAY EXAM HIP UNI 2-3 VIEWS: CPT | Mod: RT | Performed by: RADIOLOGY

## 2021-09-14 PROCEDURE — 99203 OFFICE O/P NEW LOW 30 MIN: CPT | Performed by: ORTHOPAEDIC SURGERY

## 2021-09-14 RX ORDER — MELOXICAM 15 MG/1
15 TABLET ORAL DAILY
Qty: 30 TABLET | Refills: 1 | Status: SHIPPED | OUTPATIENT
Start: 2021-09-14 | End: 2022-07-14

## 2021-09-14 ASSESSMENT — MIFFLIN-ST. JEOR: SCORE: 1661.52

## 2021-09-14 NOTE — PATIENT INSTRUCTIONS
1. Primary localized osteoarthritis of right knee    2. Acute pain of right knee      Ice, activity modification, Tylenol for pain managment    [facility]  You can call [phone] to schedule at your convenience.     Meloxicam was sent to your pharmacy on file.  While you are taking this medication, we recommend to discontinue use of NSAIDs including: Ibuprofen, Motrin, Aleve, Advil, Naproxen due to increased risk of GI upset.  Would recommend taking this medication with food.     Nutrition Referral     Follow up in 3-4 months or as needed with one of my partners     Call my office with any questions or concerns, [phone].
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
## 2021-12-25 ENCOUNTER — HEALTH MAINTENANCE LETTER (OUTPATIENT)
Age: 51
End: 2021-12-25

## 2022-02-19 ENCOUNTER — HEALTH MAINTENANCE LETTER (OUTPATIENT)
Age: 52
End: 2022-02-19

## 2022-03-18 ENCOUNTER — OFFICE VISIT (OUTPATIENT)
Dept: ORTHOPEDICS | Facility: CLINIC | Age: 52
End: 2022-03-18
Payer: COMMERCIAL

## 2022-03-18 ENCOUNTER — ANCILLARY PROCEDURE (OUTPATIENT)
Dept: GENERAL RADIOLOGY | Facility: CLINIC | Age: 52
End: 2022-03-18
Attending: STUDENT IN AN ORGANIZED HEALTH CARE EDUCATION/TRAINING PROGRAM
Payer: COMMERCIAL

## 2022-03-18 VITALS — DIASTOLIC BLOOD PRESSURE: 84 MMHG | SYSTOLIC BLOOD PRESSURE: 130 MMHG | WEIGHT: 243 LBS | BODY MASS INDEX: 44.81 KG/M2

## 2022-03-18 DIAGNOSIS — M17.12 PRIMARY OSTEOARTHRITIS OF LEFT KNEE: Primary | ICD-10-CM

## 2022-03-18 DIAGNOSIS — M17.11 PRIMARY OSTEOARTHRITIS OF ONE KNEE, RIGHT: ICD-10-CM

## 2022-03-18 DIAGNOSIS — M17.12 PRIMARY OSTEOARTHRITIS OF LEFT KNEE: ICD-10-CM

## 2022-03-18 PROCEDURE — 73562 X-RAY EXAM OF KNEE 3: CPT | Performed by: RADIOLOGY

## 2022-03-18 PROCEDURE — 99213 OFFICE O/P EST LOW 20 MIN: CPT | Performed by: STUDENT IN AN ORGANIZED HEALTH CARE EDUCATION/TRAINING PROGRAM

## 2022-03-18 ASSESSMENT — KOOS JR
STANDING UPRIGHT: MODERATE
HOW SEVERE IS YOUR KNEE STIFFNESS AFTER FIRST WAKING IN MORNING: MODERATE
STRAIGHTENING KNEE FULLY: MILD
RISING FROM SITTING: MODERATE
TWISING OR PIVOTING ON KNEE: SEVERE
GOING UP OR DOWN STAIRS: SEVERE
KOOS JR SCORING: 54.84

## 2022-03-18 NOTE — LETTER
3/18/2022         RE: Susan Barr  [address] [place] [address]        Dear Colleague,    Thank you for referring your patient, Susan Barr, to the [facility] [place]. Please see a copy of my visit note below.        [facility]  Orthopaedic Surgery Consultation by Tao Christina M.D.    Susan Barr MRN# [id number]   Age: 51 year old YOB: 1970     Requesting physician: No ref. provider found  Devon Syed     Background history:  DX:  1. Migraine   2. Morbid obesity  3. GERD.    4. PCOS  5. Hypertension  6. Marcellus-Barr infection  7. ADD    TREATMENTS:  1. 10/18/2011, laparoscopic cholecystectomy, Dr. Young  2. 10/15/2008, laparoscopic supracervical hysterectomy with bilateral salpingo-oophorectomy, Dr. Martini           History of Present Illness:   51 year old female presenting with bilateral chronic knee pain.  This pain has been present for many months.  Initially on the right but since September 2021 also left.  No clear traumatic event.  Patient describes pressure and swelling of bilateral knees.  Discomfort in the posterior knee.  Pain anterior and medial lateral walking up and down stairs, bending and riding a bike.  She denies the presence of mechanical symptoms or clear instability.  She does endorse occasional night pain.  No clear initiation stiffness or soreness.  Patient does not report significant hip or lower back pain.  No motor or sensory deficits of bilateral lower extremities.  Patient was seen previously by Dr. Mujica and was recommended to optimize nonoperative treatment in setting of early osteoarthritic changes of the right knee.  Patient states that she has done her physical therapy but stopped because she was dealing with her 's prostate cancer.  Of note her  is currently doing well.  Furthermore, patient reports that she has been working on weight reduction in the past but has stopped doing this as well.  To  mitigate the pain she has tried over-the-counter analgesics.  Patient has not had any intra-articular injections.    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 Normal mood and affect.  VITAL SIGNS: Last menstrual period 03/09/2007.  Reviewed nursing intake notes.   There is no height or weight on file to calculate BMI.  RESP: non labored breathing   ABD: benign, soft, non-tender, no acute peritoneal findings  SKIN: grossly normal   LYMPHATIC: grossly normal, no adenopathy, no extremity edema  NEURO: grossly normal , no motor deficits  VASCULAR: satisfactory perfusion of all extremities   MUSCULOSKELETAL:   Alignment: Neutral  Gait: Normal    Bilateral hips exhibit a full range of motion.  No pain upon rotations.  Lasegue's test is negative.  No tenderness to palpation of the greater trochanteric region.    L knee: -0-5 .  Deep flexion is recognizably painful. straight leg raise +. No redness, warmth or skin changes present. Effusion Minimal. Ligamentously stable in both ML and AP direction. Normal PF tracking with some crepitus. Apprehension -. Rabot +. Meniscal provocation tests are sensitive.  There is some tenderness to palpation over the medial joint line.     R knee: -0-5 .  Deep flexion is recognizably painful. straight leg raise +. No redness, warmth or skin changes present. Effusion Minimal. Ligamentously stable in both ML and AP direction. Normal PF tracking with some crepitus. Apprehension -. Rabot +. Meniscal provocation tests are sensitive.  There is some tenderness to palpation over the medial joint line.      Bilateral LE:   Thigh and leg compartments soft and compressible   +Quad/TA/GSC/FHL/EHL   SILT DP/SP/Janki/Saph/Tib nerve  distributions   Palpable dorsalis pedis pulse            Data:   All laboratory data reviewed  All imaging studies reviewed by me personally.    XR knee left 3/18/2022:   My interpretation: Moderate osteoarthritic changes of the medial and patellofemoral compartments with joint space narrowing, marginal osteophytes, sclerosis and subchondral cysts.    XR knee right mild 9/2/2021:   My interpretation: Moderate osteoarthritic changes of the medial and patellofemoral compartments with joint space narrowing, marginal osteophytes, sclerosis and subchondral cysts.         Assessment and Plan:   Assessment:  51-year-old female presenting with chronic bilateral knee pain due to moderate osteoarthritic changes most notably in medial and patellofemoral compartments.  Nonoperative treatment has been proposed previously but not optimally initiated yet.    Plan:  I had a long discussion with the patient regarding ongoing management options.  Reviewed surgical and nonsurgical treatments.  The non-surgical options include activity modification, weight reduction, pain medication, PT, bracing and injection therapy. As for surgery we discussed the option of total knee replacement surgery.   We discussed that surgical intervention at this point time would not be my recommendation.  It would be my recommendation to optimize nonoperative treatment strategies.  A formal referral to physical therapy and comprehensive weight loss clinic was offered.  Patient declined and will work on this herself.  Furthermore an intra-articular injection with a combination of lidocaine and cortisone was offered which patient declined as well.  All nonsurgical treatment modalities were discussed.  Patient understands and states that she will work on this herself.  We will follow-up with her on an as-needed basis.     More information on joint replacements can be found on : [email or web address]    Thank you for  your referral.    Tao Christina MD, PhD     Adult Reconstruction  [facility] Department of Orthopaedic Surgery  Pager [phone]      DATA for DOCUMENTATION:         Past Medical History:     Patient Active Problem List   Diagnosis     Migraine with aura and without status migrainosus, not intractable     Obesity     PCOS (polycystic ovarian syndrome)     Major depressive disorder, single episode, mild (H)     Symptomatic states associated with artificial menopause     Tension headache     GERD (gastroesophageal reflux disease)     ADD (attention deficit disorder)     CARDIOVASCULAR SCREENING; LDL GOAL LESS THAN 160     History of Lyme disease     Marcellus-Barr infection- hx of in past      Vitamin D deficiency     Symptomatic premature menopause     History of migraine with aura     Morbid obesity (H)     Hypertension goal BP (blood pressure) < 140/90     Anxiety     Past Medical History:   Diagnosis Date     ADD (attention deficit disorder)      Anxiety 12/15/2020     Asthma     allergy related     Excessive menstruation     resolved with hysterectomy/bso 10/08      Gastro-oesophageal reflux disease      GERD (gastroesophageal reflux disease)      Hypertension goal BP (blood pressure) < 140/90      Hypertension goal BP (blood pressure) < 140/90      Lyme disease 2005     Major depressive disorder, single episode, mild (H) 05/2009     Obesity, unspecified      PCOS (polycystic ovarian syndrome) 2004    dr aragon/jameson - watching DM       Also see scanned health assessment forms.       Past Surgical History:     Past Surgical History:   Procedure Laterality Date     CHOLECYSTECTOMY, LAPOROSCOPIC  10/11    dr young     CYSTECTOMY PILONIDAL       HYSTERECTOMY, SUPRACERVICAL LAPAROSCOPIC  10/08    supracevical hysterectomy/BSO - Dr Martini     LAPAROSCOPIC CHOLECYSTECTOMY  10/18/2011    Dr Young     LAPAROSCOPIC TUBAL LIGATION  1991     TONSILLECTOMY              Social  History:     Social History     Socioeconomic History     Marital status:      Spouse name: Not on file     Number of children: Not on file     Years of education: Not on file     Highest education level: Not on file   Occupational History     Not on file   Tobacco Use     Smoking status: Never Smoker     Smokeless tobacco: Never Used   Substance and Sexual Activity     Alcohol use: Yes     Comment: rare     Drug use: No     Sexual activity: Yes     Partners: Male     Birth control/protection: Surgical     Comment: HYSTERECTOMY AND PROSTYTECTOMY   Other Topics Concern     Parent/sibling w/ CABG, MI or angioplasty before 65F 55M? Yes     Comment: My Father had a heart attach in his 40s      Service Not Asked     Blood Transfusions Not Asked     Caffeine Concern Yes     Comment: 1 to 2 cups of coffee day.     Occupational Exposure Not Asked     Hobby Hazards Not Asked     Sleep Concern Yes     Comment: Excessive daytime drowsiness.     Stress Concern Not Asked     Weight Concern Not Asked     Special Diet Not Asked     Back Care Not Asked     Exercise Not Asked     Bike Helmet Not Asked     Seat Belt Not Asked     Self-Exams Yes   Social History Narrative     Not on file     Social Determinants of Health     Financial Resource Strain: Not on file   Food Insecurity: Not on file   Transportation Needs: Not on file   Physical Activity: Not on file   Stress: Not on file   Social Connections: Not on file   Intimate Partner Violence: Not on file   Housing Stability: Not on file            Family History:       Family History   Problem Relation Age of Onset     Lipids Mother      Hypertension Mother      Breast Cancer Mother         70's.        Alzheimer Disease Mother      Hyperlipidemia Mother      Heart Disease Father         Heart disease     Diabetes Father         Adult onset     Other Cancer Father         Lung Cancer/smoker     Obesity Father      Thyroid Disease Sister         Hypothyroidism      Mental Illness Sister      Psychotic Disorder Sister         Schizophrenia     Thyroid Disease Sister      Gastrointestinal Disease Paternal Grandfather         Kidney failure     Diabetes Paternal Grandmother         adult onset     Hypertension Brother             Medications:     Current Outpatient Medications   Medication Sig     albuterol (PROAIR HFA/PROVENTIL HFA/VENTOLIN HFA) 108 (90 BASE) MCG/ACT Inhaler Inhale 2 puffs into the lungs every 6 hours as needed for shortness of breath / dyspnea or wheezing     meloxicam (MOBIC) 15 MG tablet Take 1 tablet (15 mg) by mouth daily     No current facility-administered medications for this visit.              Review of Systems:   A comprehensive 10 point review of systems (constitutional, ENT, cardiac, peripheral vascular, lymphatic, respiratory, GI, , Musculoskeletal, skin, Neurological) was performed and found to be negative except as described in this note.     See intake form completed by patient          Again, thank you for allowing me to participate in the care of your patient.        Sincerely,        Tao Christina MD

## 2022-03-18 NOTE — PROGRESS NOTES
[facility]  Orthopaedic Surgery Consultation by Tao Christina M.D.    Susan Barr MRN# [id number]   Age: 51 year old YOB: 1970     Requesting physician: No ref. provider found  Devon Syed     Background history:  DX:  1. Migraine   2. Morbid obesity  3. GERD.    4. PCOS  5. Hypertension  6. Marcellus-Barr infection  7. ADD    TREATMENTS:  1. 10/18/2011, laparoscopic cholecystectomy, Dr. Young  2. 10/15/2008, laparoscopic supracervical hysterectomy with bilateral salpingo-oophorectomy, Dr. Martini           History of Present Illness:   51 year old female presenting with bilateral chronic knee pain.  This pain has been present for many months.  Initially on the right but since September 2021 also left.  No clear traumatic event.  Patient describes pressure and swelling of bilateral knees.  Discomfort in the posterior knee.  Pain anterior and medial lateral walking up and down stairs, bending and riding a bike.  She denies the presence of mechanical symptoms or clear instability.  She does endorse occasional night pain.  No clear initiation stiffness or soreness.  Patient does not report significant hip or lower back pain.  No motor or sensory deficits of bilateral lower extremities.  Patient was seen previously by Dr. Mujica and was recommended to optimize nonoperative treatment in setting of early osteoarthritic changes of the right knee.  Patient states that she has done her physical therapy but stopped because she was dealing with her 's prostate cancer.  Of note her  is currently doing well.  Furthermore, patient reports that she has been working on weight reduction in the past but has stopped doing this as well.  To mitigate the pain she has tried over-the-counter analgesics.  Patient has not had any intra-articular injections.    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 Normal mood and affect.  VITAL SIGNS: Last menstrual period 03/09/2007.  Reviewed nursing intake notes.   There is no height or weight on file to calculate BMI.  RESP: non labored breathing   ABD: benign, soft, non-tender, no acute peritoneal findings  SKIN: grossly normal   LYMPHATIC: grossly normal, no adenopathy, no extremity edema  NEURO: grossly normal , no motor deficits  VASCULAR: satisfactory perfusion of all extremities   MUSCULOSKELETAL:   Alignment: Neutral  Gait: Normal    Bilateral hips exhibit a full range of motion.  No pain upon rotations.  Lasegue's test is negative.  No tenderness to palpation of the greater trochanteric region.    L knee: -0-5 .  Deep flexion is recognizably painful. straight leg raise +. No redness, warmth or skin changes present. Effusion Minimal. Ligamentously stable in both ML and AP direction. Normal PF tracking with some crepitus. Apprehension -. Rabot +. Meniscal provocation tests are sensitive.  There is some tenderness to palpation over the medial joint line.     R knee: -0-5 .  Deep flexion is recognizably painful. straight leg raise +. No redness, warmth or skin changes present. Effusion Minimal. Ligamentously stable in both ML and AP direction. Normal PF tracking with some crepitus. Apprehension -. Rabot +. Meniscal provocation tests are sensitive.  There is some tenderness to palpation over the medial joint line.      Bilateral LE:   Thigh and leg compartments soft and compressible   +Quad/TA/GSC/FHL/EHL   SILT DP/SP/Janki/Saph/Tib nerve distributions   Palpable dorsalis pedis pulse            Data:   All laboratory data reviewed  All imaging studies reviewed by me personally.    XR knee left 3/18/2022:   My interpretation: Moderate osteoarthritic changes of the medial and patellofemoral compartments  with joint space narrowing, marginal osteophytes, sclerosis and subchondral cysts.    XR knee right mild 9/2/2021:   My interpretation: Moderate osteoarthritic changes of the medial and patellofemoral compartments with joint space narrowing, marginal osteophytes, sclerosis and subchondral cysts.         Assessment and Plan:   Assessment:  51-year-old female presenting with chronic bilateral knee pain due to moderate osteoarthritic changes most notably in medial and patellofemoral compartments.  Nonoperative treatment has been proposed previously but not optimally initiated yet.    Plan:  I had a long discussion with the patient regarding ongoing management options.  Reviewed surgical and nonsurgical treatments.  The non-surgical options include activity modification, weight reduction, pain medication, PT, bracing and injection therapy. As for surgery we discussed the option of total knee replacement surgery.   We discussed that surgical intervention at this point time would not be my recommendation.  It would be my recommendation to optimize nonoperative treatment strategies.  A formal referral to physical therapy and comprehensive weight loss clinic was offered.  Patient declined and will work on this herself.  Furthermore an intra-articular injection with a combination of lidocaine and cortisone was offered which patient declined as well.  All nonsurgical treatment modalities were discussed.  Patient understands and states that she will work on this herself.  We will follow-up with her on an as-needed basis.     More information on joint replacements can be found on : [email or web address]    Thank you for your referral.    Tao Christina MD, PhD     Adult Reconstruction  [facility] Department of Orthopaedic Surgery  Pager [phone]      DATA for DOCUMENTATION:         Past Medical History:     Patient Active Problem List    Diagnosis     Migraine with aura and without status migrainosus, not intractable     Obesity     PCOS (polycystic ovarian syndrome)     Major depressive disorder, single episode, mild (H)     Symptomatic states associated with artificial menopause     Tension headache     GERD (gastroesophageal reflux disease)     ADD (attention deficit disorder)     CARDIOVASCULAR SCREENING; LDL GOAL LESS THAN 160     History of Lyme disease     Marcellus-Barr infection- hx of in past      Vitamin D deficiency     Symptomatic premature menopause     History of migraine with aura     Morbid obesity (H)     Hypertension goal BP (blood pressure) < 140/90     Anxiety     Past Medical History:   Diagnosis Date     ADD (attention deficit disorder)      Anxiety 12/15/2020     Asthma     allergy related     Excessive menstruation     resolved with hysterectomy/bso 10/08      Gastro-oesophageal reflux disease      GERD (gastroesophageal reflux disease)      Hypertension goal BP (blood pressure) < 140/90      Hypertension goal BP (blood pressure) < 140/90      Lyme disease 2005     Major depressive disorder, single episode, mild (H) 05/2009     Obesity, unspecified      PCOS (polycystic ovarian syndrome) 2004    dr aragon/jameson - watching DM       Also see scanned health assessment forms.       Past Surgical History:     Past Surgical History:   Procedure Laterality Date     CHOLECYSTECTOMY, LAPOROSCOPIC  10/11    dr young     CYSTECTOMY PILONIDAL       HYSTERECTOMY, SUPRACERVICAL LAPAROSCOPIC  10/08    supracevical hysterectomy/BSO - Dr Martini     LAPAROSCOPIC CHOLECYSTECTOMY  10/18/2011    Dr Young     LAPAROSCOPIC TUBAL LIGATION  1991     TONSILLECTOMY              Social History:     Social History     Socioeconomic History     Marital status:      Spouse name: Not on file     Number of children: Not on file     Years of education: Not on file     Highest education level: Not on file   Occupational History     Not on file    Tobacco Use     Smoking status: Never Smoker     Smokeless tobacco: Never Used   Substance and Sexual Activity     Alcohol use: Yes     Comment: rare     Drug use: No     Sexual activity: Yes     Partners: Male     Birth control/protection: Surgical     Comment: HYSTERECTOMY AND PROSTYTECTOMY   Other Topics Concern     Parent/sibling w/ CABG, MI or angioplasty before 65F 55M? Yes     Comment: My Father had a heart attach in his 40s      Service Not Asked     Blood Transfusions Not Asked     Caffeine Concern Yes     Comment: 1 to 2 cups of coffee day.     Occupational Exposure Not Asked     Hobby Hazards Not Asked     Sleep Concern Yes     Comment: Excessive daytime drowsiness.     Stress Concern Not Asked     Weight Concern Not Asked     Special Diet Not Asked     Back Care Not Asked     Exercise Not Asked     Bike Helmet Not Asked     Seat Belt Not Asked     Self-Exams Yes   Social History Narrative     Not on file     Social Determinants of Health     Financial Resource Strain: Not on file   Food Insecurity: Not on file   Transportation Needs: Not on file   Physical Activity: Not on file   Stress: Not on file   Social Connections: Not on file   Intimate Partner Violence: Not on file   Housing Stability: Not on file            Family History:       Family History   Problem Relation Age of Onset     Lipids Mother      Hypertension Mother      Breast Cancer Mother         70's.        Alzheimer Disease Mother      Hyperlipidemia Mother      Heart Disease Father         Heart disease     Diabetes Father         Adult onset     Other Cancer Father         Lung Cancer/smoker     Obesity Father      Thyroid Disease Sister         Hypothyroidism     Mental Illness Sister      Psychotic Disorder Sister         Schizophrenia     Thyroid Disease Sister      Gastrointestinal Disease Paternal Grandfather         Kidney failure     Diabetes Paternal Grandmother         adult onset     Hypertension Brother              Medications:     Current Outpatient Medications   Medication Sig     albuterol (PROAIR HFA/PROVENTIL HFA/VENTOLIN HFA) 108 (90 BASE) MCG/ACT Inhaler Inhale 2 puffs into the lungs every 6 hours as needed for shortness of breath / dyspnea or wheezing     meloxicam (MOBIC) 15 MG tablet Take 1 tablet (15 mg) by mouth daily     No current facility-administered medications for this visit.              Review of Systems:   A comprehensive 10 point review of systems (constitutional, ENT, cardiac, peripheral vascular, lymphatic, respiratory, GI, , Musculoskeletal, skin, Neurological) was performed and found to be negative except as described in this note.     See intake form completed by patient

## 2022-06-09 ENCOUNTER — MYC MEDICAL ADVICE (OUTPATIENT)
Dept: FAMILY MEDICINE | Facility: CLINIC | Age: 52
End: 2022-06-09
Payer: COMMERCIAL

## 2022-06-09 DIAGNOSIS — I10 HYPERTENSION GOAL BP (BLOOD PRESSURE) < 140/90: ICD-10-CM

## 2022-06-13 NOTE — TELEPHONE ENCOUNTER
Patient calling back -- wondering about that to do with her blood pressure, wants to know if should start up again.  Please call patient to advise       Malina Burrell

## 2022-06-14 NOTE — TELEPHONE ENCOUNTER
Please triage, please check PHQ/ANDREW/BP    Last visit virtual 12/2020    Advice then    HA debbie/Dr Castillo  Metoprolol, watch BP, may need to adjust  Continue lexapro, refilled, hold on psychology/psychiatry

## 2022-06-14 NOTE — TELEPHONE ENCOUNTER
"Called and spoke with patient.     Checked BP today, having heart palpitations as well. Feels like heart is racing at times. Denies chest pain, sob. Racing lasts a couple minutes. Was waking up from headaches. Restarted the metoprolol 50 mg ER on Thursday. Headaches went away.     Checked BP this afternoon it was 16?/90. Rechecked it a couple times. Came down to 138/89, pulse 61.     Patient notes trouble sleeping while on the lexapro. States mood is \"struggling\" - depression symptoms. Denies thoughts of self-harm to self or others.     PHQ/ANDREW sent to patient.     Has video visit tomorrow.     Nikole Shah RN  [facility] [place]         "

## 2022-06-15 ENCOUNTER — VIRTUAL VISIT (OUTPATIENT)
Dept: FAMILY MEDICINE | Facility: CLINIC | Age: 52
End: 2022-06-15
Payer: COMMERCIAL

## 2022-06-15 DIAGNOSIS — F98.8 ATTENTION DEFICIT DISORDER, UNSPECIFIED HYPERACTIVITY PRESENCE: ICD-10-CM

## 2022-06-15 DIAGNOSIS — F41.9 ANXIETY: ICD-10-CM

## 2022-06-15 DIAGNOSIS — F32.0 MAJOR DEPRESSIVE DISORDER, SINGLE EPISODE, MILD (H): ICD-10-CM

## 2022-06-15 DIAGNOSIS — Z51.81 MEDICATION MONITORING ENCOUNTER: ICD-10-CM

## 2022-06-15 DIAGNOSIS — I10 HYPERTENSION GOAL BP (BLOOD PRESSURE) < 140/90: Primary | ICD-10-CM

## 2022-06-15 PROCEDURE — 99214 OFFICE O/P EST MOD 30 MIN: CPT | Mod: 95 | Performed by: FAMILY MEDICINE

## 2022-06-15 RX ORDER — METOPROLOL SUCCINATE 50 MG/1
50 TABLET, EXTENDED RELEASE ORAL DAILY
Qty: 90 TABLET | Refills: 3 | Status: SHIPPED | OUTPATIENT
Start: 2022-06-15 | End: 2023-04-19 | Stop reason: SINTOL

## 2022-06-15 ASSESSMENT — PATIENT HEALTH QUESTIONNAIRE - PHQ9
SUM OF ALL RESPONSES TO PHQ QUESTIONS 1-9: 3
SUM OF ALL RESPONSES TO PHQ QUESTIONS 1-9: 3
10. IF YOU CHECKED OFF ANY PROBLEMS, HOW DIFFICULT HAVE THESE PROBLEMS MADE IT FOR YOU TO DO YOUR WORK, TAKE CARE OF THINGS AT HOME, OR GET ALONG WITH OTHER PEOPLE: NOT DIFFICULT AT ALL

## 2022-06-15 NOTE — PROGRESS NOTES
"Sharon is a 51 year old who is being evaluated via a billable video visit.      How would you like to obtain your AVS? MyChart  If the video visit is dropped, the invitation should be resent by: [phone]  Will anyone else be joining your video visit? No          Assessment & Plan       ICD-10-CM    1. Hypertension goal BP (blood pressure) < 140/90  I10 metoprolol succinate ER (TOPROL XL) 50 MG 24 hr tablet   2. Major depressive disorder, single episode, mild (H)  F32.0    3. Anxiety  F41.9    4. Attention deficit disorder, unspecified hyperactivity presence  F98.8    5. Medication monitoring encounter  Z51.81        Discussed treatment/modality options, including risk and benefits, she desires:    1) continue metoprolol XL 50 mg daily, HA's have resolved, some palpitations have persisted    2) hold on lexapro    3) consider psychology/counseling    4) cpx/med check fasting soon    All diagnosis above reviewed and noted above, otherwise stable.      See [facility] orders for further details.      BMI:   Estimated body mass index is 44.81 kg/m  as calculated from the following:    Height as of 9/14/21: 1.568 m (5' 1.75\").    Weight as of 3/18/22: 110.2 kg (243 lb).   Weight management plan: diet and exercise    Return in about 1 month (around 7/15/2022) for Complete Physical, Medication Recheck Visit, Follow Up Chronic.    No LOS data to display    Doing chart review, history and exam, documentation and further activities as noted.           Devon Syed MD, FAAFP     [facility]  [address] [place] [id number]  [email or web address]   Office: [phone]  Fax: [phone]  Pager: [phone]     Juana Herrera is a 51 year old, presenting for the following health issues:    History of Present Illness       Hypertension: She presents for follow up of hypertension.  She does check blood pressure  regularly outside of the " clinic. Outside blood pressures have been over 140/90. She does not follow a low salt diet.      Today's PHQ-9         PHQ-9 Total Score: 3    PHQ-9 Q9 Thoughts of better off dead/self-harm past 2 weeks :   Not at all    How difficult have these problems made it for you to do your work, take care of things at home, or get along with other people: Not difficult at all     Hypertension    BP Readings from Last 3 Encounters:   03/18/22 130/84   09/14/21 138/88   09/02/21 (!) 155/95     Concern - high blood pressure readings   Home check highest 167/100  Headaches, heart palpitations initially - no LH, no cp, no sob - HA's have resolved   Had stopped blood pressure medication a long time ago  Started taking metoprolol x 1 week     Onset: x 2 weeks   Description: headaches, heart palpitations   Intensity: moderate  Progression of Symptoms:  same , blood pressure still have been high   Accompanying Signs & Symptoms: headaches , heart palpitations     Previous history of similar problem: no  Precipitating factors:        Worsened by: no   Alleviating factors:        Improved by: no   Therapies tried and outcome: taking med for blood pressure that she had stopped previously. Blood pressure readings are a little better, headaches improved. Still having heart palpitations.     PHQ 11/19/2020 12/14/2020 6/15/2022   PHQ-9 Total Score 16 2 3   Q9: Thoughts of better off dead/self-harm past 2 weeks More than half the days Not at all Not at all     ANDREW-7 SCORE 10/12/2020 11/19/2020 12/14/2020   Total Score - - 0 (minimal anxiety)   Total Score 0 8 0     Review of Systems   CONSTITUTIONAL: NEGATIVE for fever, chills, change in weight  INTEGUMENTARY/SKIN: NEGATIVE for worrisome rashes, moles or lesions  EYES: NEGATIVE for vision changes or irritation  ENT/MOUTH: NEGATIVE for ear, mouth and throat problems  RESP: NEGATIVE for significant cough or SOB  CV: NEGATIVE for chest pain, palpitations or peripheral edema  GI: NEGATIVE for  nausea, abdominal pain, heartburn, or change in bowel habits  : NEGATIVE for frequency, dysuria, or hematuria  MUSCULOSKELETAL: NEGATIVE for significant arthralgias or myalgia  NEURO: NEGATIVE for weakness, dizziness or paresthesias  ENDOCRINE: NEGATIVE for temperature intolerance, skin/hair changes  HEME: NEGATIVE for bleeding problems  PSYCHIATRIC: NEGATIVE for changes in mood or affect      Objective       Vitals:  No vitals were obtained today due to virtual visit.    Physical Exam   GENERAL: Healthy, alert and no distress  EYES: Eyes grossly normal to inspection.  No discharge or erythema, or obvious scleral/conjunctival abnormalities.  RESP: No audible wheeze, cough, or visible cyanosis.  No visible retractions or increased work of breathing.    SKIN: Visible skin clear. No significant rash, abnormal pigmentation or lesions.  NEURO: Cranial nerves grossly intact.  Mentation and speech appropriate for age.  PSYCH: Mentation appears normal, affect normal/bright, judgement and insight intact, normal speech and appearance well-groomed.    Video-Visit Details    Video Start Time: 5:40 PM    Type of service:  Video Visit    Video End Time:6:15 PM    Originating Location (pt. Location): Home    Distant Location (provider location):  [facility]     Platform used for Video Visit: [employer]  ..

## 2022-06-16 NOTE — TELEPHONE ENCOUNTER
Patient had appointment regarding BP  6/15/22.  Closing encounter      Lorena BHATTI RN   [facility] Triage

## 2022-06-16 NOTE — PROGRESS NOTES
Physical with med check scheduled for 7/19/22 and ANDREW sent via [employer] for patient to complete.     Beto Hernadez

## 2022-06-20 ENCOUNTER — TELEPHONE (OUTPATIENT)
Dept: ORTHOPEDICS | Facility: CLINIC | Age: 52
End: 2022-06-20
Payer: COMMERCIAL

## 2022-06-20 NOTE — TELEPHONE ENCOUNTER
Received a faxed refill request from [facility] for Meloxicam 15mg for Dr. Mujica.   Left voicemail informing them that Dr. Mujica is no longer practicing in [place] and has moved out of state. We will not be refilling this medication. Patient may call to see another provider to discuss a refill.   Triage number provided for questions.     SALMA Valencia RN

## 2022-06-23 ENCOUNTER — TELEPHONE (OUTPATIENT)
Dept: FAMILY MEDICINE | Facility: CLINIC | Age: 52
End: 2022-06-23

## 2022-06-23 NOTE — TELEPHONE ENCOUNTER
Reason for Call:  Other prescription    Detailed comments: Patient states that the tylenol is not working and patient is in a lot of pain. She'd like to be put on something different if possible as she was advised to stop taking a medication as it might be associated with her increased BP. Please advise.     Phone Number Patient can be reached at: Cell number on file:    Telephone Information:   Mobile [phone]       Best Time: Any     Can we leave a detailed message on this number? YES    Call taken on 6/23/2022 at 2:18 PM by Beto Hernadez

## 2022-06-24 NOTE — TELEPHONE ENCOUNTER
Last seen by myself in the office 2016, recent virtual visit 6/15/2022, prior virtual visit 12/15/2020    Was seen by ortho 3/18/2022    Please triage the pain - bilateral knee/hip pain?    Consider follow up with ortho/pain    Needs to be seen

## 2022-06-28 NOTE — TELEPHONE ENCOUNTER
Called # below     Advised pt on the information below and reviewed max dose of tylenol and motrin a day     Patient stated an understanding and agreed with plan.    Brooke Ramey RN, BSN  [facility]

## 2022-07-12 ENCOUNTER — NURSE TRIAGE (OUTPATIENT)
Dept: NURSING | Facility: CLINIC | Age: 52
End: 2022-07-12

## 2022-07-13 NOTE — TELEPHONE ENCOUNTER
"Nurse Triage SBAR    Is this a 2nd Level Triage? NO    Situation: Patient calling with Covid positive home test.  Consent: not needed    Background: Covid Positive.     Assessment:   Sx began yesterday - Covid positive home test today.   Cough, Chills, Body aches, headache, sore throat, muscle pain - States she is coughing \"here and there\" nothing like a coughing fit.    Worse Sx:  Aching  RESPIRATORY:  Denies SOB, wheezing - Sx today are worse than  Yesterday.   CHRONIC DISEASE:  Obesity, Hypertension  VACCINE:  Moderna Vaccines and no boosters - 1st time having Covid.   PREGNANCY:  Not possible.   OXYGEN:  No reader.  Has BP cuff though      Protocol Recommended Disposition:   Home Care - transferred to the Covid treatment line.     Recommendation: Advised patient to Home care and to  make an appointment. Transferred to scheduling. . Reviewed concerning symptoms and when to call back.     Lisandra Kwon RN [facility] 7/12/2022 7:51 PM    COVID 19 Nurse Triage Plan/Patient Instructions    Please be aware that novel coronavirus (COVID-19) may be circulating in the community. If you develop symptoms such as fever, cough, or SOB or if you have concerns about the presence of another infection including coronavirus (COVID-19), please contact your health care provider or visit [email or web address].     Disposition/Instructions    Home care recommended. Follow home care protocol based instructions.    Thank you for taking steps to prevent the spread of this virus.  o Limit your contact with others.  o Wear a simple mask to cover your cough.  o Wash your hands well and often.    Resources    [facility]: About COVID-19: [email or web address]    CDC: What to Do If You're Sick: www.cdc.gov/coronavirus/2019-ncov/about/steps-when-sick.html    CDC: Ending Home Isolation: www.cdc.gov/coronavirus/2019-ncov/hcp/disposition-in-home-patients.html     CDC: Caring for Someone: " www.cdc.gov/coronavirus/2019-ncov/if-you-are-sick/care-for-someone.html     [facility]: Interim Guidance for Hospital Discharge to Home: www.health.[facility].[place]/diseases/coronavirus/hcp/hospdischarge.pdf    [facility] clinical trials (COVID-19 research studies): clinicalaffairs.[email or web address]-clinical-trials     Below are the COVID-19 hotlines at the [place] Department of Health ([facility]). Interpreters are available.   o For health questions: Call 988-289-0234 or 1-240.506.7971 (7 a.m. to 7 p.m.)  o For questions about schools and childcare: Call 183-075-4867 or 1-644.875.4715 (7 a.m. to 7 p.m.)                         Reason for Disposition    [1] COVID-19 diagnosed by positive lab test (e.g., PCR, rapid self-test kit) AND [2] mild symptoms (e.g., cough, fever, others) AND [3] no complications or SOB    Additional Information    Negative: SEVERE difficulty breathing (e.g., struggling for each breath, speaks in single words)    Negative: Difficult to awaken or acting confused (e.g., disoriented, slurred speech)    Negative: Bluish (or gray) lips or face now    Negative: Shock suspected (e.g., cold/pale/clammy skin, too weak to stand, low BP, rapid pulse)    Negative: Sounds like a life-threatening emergency to the triager    Negative: [1] Diagnosed or suspected COVID-19 AND [2] symptoms lasting 3 or more weeks    Negative: [1] COVID-19 exposure AND [2] no symptoms    Negative: COVID-19 vaccine reaction suspected (e.g., fever, headache, muscle aches) occurring 1 to 3 days after getting vaccine    Negative: COVID-19 vaccine, questions about    Negative: [1] Lives with someone known to have influenza (flu test positive) AND [2] flu-like symptoms (e.g., cough, runny nose, sore throat, SOB; with or without fever)    Negative: [1] Adult with possible COVID-19 symptoms AND [2] triager concerned about severity of symptoms or other causes    Negative: COVID-19 and breastfeeding, questions about    Negative: SEVERE or  constant chest pain or pressure  (Exception: Mild central chest pain, present only when coughing.)    Negative: MODERATE difficulty breathing (e.g., speaks in phrases, SOB even at rest, pulse 100-120)    Negative: [1] Headache AND [2] stiff neck (can't touch chin to chest)    Negative: Oxygen level (e.g., pulse oximetry) 90 percent or lower    Negative: Chest pain or pressure    Negative: Patient sounds very sick or weak to the triager    Negative: MILD difficulty breathing (e.g., minimal/no SOB at rest, SOB with walking, pulse <100)    Negative: Fever > 103 F (39.4 C)    Negative: [1] Fever > 101 F (38.3 C) AND [2] age > 60 years    Negative: [1] Fever > 100.0 F (37.8 C) AND [2] bedridden (e.g., nursing home patient, CVA, chronic illness, recovering from surgery)    Negative: HIGH RISK for severe COVID complications (e.g., weak immune system, age > 64 years, obesity with BMI > 25, pregnant, chronic lung disease or other chronic medical condition)  (Exception: Already seen by PCP and no new or worsening symptoms.)    Negative: [1] HIGH RISK patient AND [2] influenza is widespread in the community AND [3] ONE OR MORE respiratory symptoms: cough, sore throat, runny or stuffy nose    Negative: [1] HIGH RISK patient AND [2] influenza exposure within the last 7 days AND [3] ONE OR MORE respiratory symptoms: cough, sore throat, runny or stuffy nose    Negative: Oxygen level (e.g., pulse oximetry) 91 to 94 percent    Negative: Fever present > 3 days (72 hours)    Negative: [1] Fever returns after gone for over 24 hours AND [2] symptoms worse or not improved    Negative: [1] Continuous (nonstop) coughing interferes with work or school AND [2] no improvement using cough treatment per Care Advice    Negative: [1] COVID-19 diagnosed by positive lab test (e.g., PCR, rapid self-test kit) AND [2] NO symptoms (e.g., cough, fever, others)    Protocols used: CORONAVIRUS (COVID-19) DIAGNOSED OR [id number] 1.18.2022

## 2022-07-14 ENCOUNTER — VIRTUAL VISIT (OUTPATIENT)
Dept: FAMILY MEDICINE | Facility: CLINIC | Age: 52
End: 2022-07-14
Payer: COMMERCIAL

## 2022-07-14 DIAGNOSIS — U07.1 INFECTION DUE TO 2019 NOVEL CORONAVIRUS: Primary | ICD-10-CM

## 2022-07-14 PROCEDURE — 99203 OFFICE O/P NEW LOW 30 MIN: CPT | Mod: 95 | Performed by: NURSE PRACTITIONER

## 2022-07-14 RX ORDER — IPRATROPIUM BROMIDE 42 UG/1
2 SPRAY, METERED NASAL 4 TIMES DAILY
Qty: 15 ML | Refills: 0 | Status: SHIPPED | OUTPATIENT
Start: 2022-07-14 | End: 2022-07-19

## 2022-07-14 NOTE — PATIENT INSTRUCTIONS
Instructions for Patients      What are the symptoms of COVID-19?  Symptoms can include fever, cough, shortness of breath, chills, headache, muscle pain sore throat, fatigue, runny or stuffy nose, and loss of taste and smell. Other less common symptoms include nausea, vomiting, or diarrhea (watery stools).    Know when to call 911. Emergency warning signs include:    Trouble breathing or shortness of breath    Pain or pressure in the chest that doesn't go away    Feeling confused like you haven't felt before, or not being able to wake up    Bluish-colored lips or face    How can I take care of myself?  1. Get lots of rest. Drink extra fluids (unless a doctor has told you not to).  2. Take Tylenol (acetaminophen) for fever or pain. If you have liver or kidney problems, ask your family doctor if it's okay to take Tylenol   Adults:   650 mg (two 325 mg pills or tablets) every 4 to 6 hours, or...   1,000 mg (two 500 mg pills or tablets) every 8 hours as needed.  Note: Don't take more than 3,000 mg in one day. Acetaminophen is found in many medicines (both prescribed and over the counter). Read all labels to be sure you don't take too much.  For children, check the Tylenol bottle for the right dose. The dose is based on the child's age or weight.  3. Take over the counter medicines for your symptoms as needed. Talk to your pharmacist.  4. If you have other health problems (like cancer, heart failure, an organ transplant, or severe kidney disease): Call your specialty clinic if you don't feel better in the next 2 days.    These guidelines are for isolating and quarantining before returning to work, school or .     For employers, schools and day cares: This is an official notice for this person s medical guidelines for returning in-person.     For health care sites: The CDC gives different isolation and quarantine guidelines for healthcare sites, please check with these sites before arriving.     How do I  self-isolate?  You isolate when you have symptoms of COVID or a test shows you have COVID, even if you don t have symptoms.     If you DO have symptoms:  o Stay home and away from others  - For at least 5 days after your symptoms started, AND   - You are fever free for 24 hours (with no medicine that reduces fever), AND  - Your other symptoms are better.  o Wear a mask for 10 full days any time you are around others.    If you DON T have symptoms:  o Stay at home and away from others for at least 5 days after your positive test.  o Wear a mask for 10 full days any time you are around others.    How and when do I quarantine?  You quarantine when you may have been exposed to the virus and DON T have symptoms.     Stay home and away from others.     You must quarantine for 5 days after your last contact with a person who has COVID.  o Note: If you are fully vaccinated, you don t need to quarantine. You should still follow the steps below.     Wear a mask for 10 full days any time you re around others.    Get tested at least 5 days after you were exposed, even if you don t have symptoms.     If you start to have symptoms, isolate right away and get tested.    Where can I get more information?    [facility] COVID-19 Resource Hub: [email or web address]     CDC Quarantine & Isolation: https://www.cdc.gov/coronavirus/2019-ncov/your-health/quarantine-isolation.html     CDC - What to Do If You're Sick: https://www.cdc.gov/coronavirus/2019-ncov/if-you-are-sick/index.html    [facility] clinical trials (COVID-19 research studies): clinicalaffairs.[facility]/umn-clinical-trials    [place] Department of Health COVID-19 Public Hotline: 1-348.856.1467  Coping with Life After COVID-19  Being in the hospital because of COVID-19 is scary. Going home can be scary, too. You may face changes to your life, the way you work or what you can eat. It s hard to adjust to change, and it s normal to feel afraid,  frustrated or even angry. These feelings usually go away over time. If your feelings don t start to get better, it s called  adjustment disorder.      What signs should I look out for?  Adjustment disorder can happen to anyone in a time of stress. It makes it hard to cope with daily life. You may feel lonely or fight with loved ones, even if you re glad to be home. Watch for these signs:  Fear or worry  Hard time focusing  Sadness or anger  Trouble talking to family or friends  Feeling like you don t fit in or isolating yourself  Problems with sleep   Drinking alcohol or taking drugs to cope    What can I do?  You can help yourself get better. Feeling you have control helps you move forward. You may wonder if you ll be able to do things you did before. Be patient. Do your best to make the most of every day. Try to build relationships, be as active as you can, eat right and keep a sense of humor. Avoid smoking and drinking too much alcohol. Call your family doctor or clinic if you re not sure what to do. They can guide you to care or other services.    When should I get help?  Think about getting counseling if your sadness or frustration gets worse. Together with a trained counselor, you can talk about your problems adjusting to life after your hospital stay. You can come up with new ways to handle changes that give you more control. Your family doctor or care team can help you find a counselor.     Get help if you re thinking about hurting yourself. If you need help right away, call 911 or go to the nearest emergency room. You can also try the Crisis Text Line.    Crisis Text Line: 438-971 (http://www.crisistextline.org)  The Crisis Text Line serves anyone, in any crisis. It gives free, 24/7 support. Here's how it works:  Text HOME to 894418 from anywhere in the [place], anytime, about any type of crisis.  A live, trained Crisis Counselor will text you back quickly.  The volunteer Crisis Counselor can help you move from  a  hot moment  to a  cool moment.  They can also help you work out a safety plan.

## 2022-07-14 NOTE — PROGRESS NOTES
Sharon is a 51 year old who is being evaluated via a billable video visit.      How would you like to obtain your AVS? MyChart  If the video visit is dropped, the invitation should be resent by: Text to cell phone: [phone]  Will anyone else be joining your video visit? No    Assessment & Plan   Problem List Items Addressed This Visit    None     Visit Diagnoses     Infection due to 2019 novel coronavirus    -  Primary    Relevant Medications    ipratropium (ATROVENT) 0.06 % nasal spray         Manageable improving symptoms. Given nasal spray for prn use for congestion. Follow up if worsening symptoms develop.    Prescription drug management        Return in about 2 weeks (around 7/28/2022) for symptoms failing to improve or worsening.    [facility]   Sharon is a 51 year old, presenting for the following health issues:  Covid Concern    HPI     COVID-19 Symptom Review - Covid Positive 07/12/2022 Home Test  How many days ago did these symptoms start? 07/11/2022    Are any of the following symptoms significant for you?    New or worsening difficulty breathing? No    Worsening cough? Yes, I am coughing up mucus and it is sometime Dry as well.     Fever or chills? Yes, I felt feverish or had chills.    Headache: YES    Sore throat: Scratchy throat, started in chest.     Chest pain: No    Diarrhea: No    Body aches? YES    What treatments has patient tried? Acetaminophen, Ibuprofen and NighQuil   Does patient live in a nursing home, group home, or shelter? No  Does patient have a way to get food/medications during quarantined? Yes, I have a friend or family member who can help me.      Quite a bit better today. Started with dry cough first couple of days. Now moving back into sinuses.     No chills or aches today.   Review of Systems   Constitutional, HEENT, cardiovascular, pulmonary, GI, , musculoskeletal, neuro, skin, endocrine and psych systems are negative,  except as otherwise noted.      Objective       Vitals:  No vitals were obtained today due to virtual visit.    Physical Exam   GENERAL: Healthy, alert and no distress  EYES: Eyes grossly normal to inspection.  No discharge or erythema, or obvious scleral/conjunctival abnormalities.  RESP: No audible wheeze, cough, or visible cyanosis.  No visible retractions or increased work of breathing.    SKIN: Visible skin clear. No significant rash, abnormal pigmentation or lesions.  NEURO: Cranial nerves grossly intact.  Mentation and speech appropriate for age.  PSYCH: Mentation appears normal, affect normal/bright, judgement and insight intact, normal speech and appearance well-groomed.                Video-Visit Details    Video Start Time: 1115    Type of service:  Video Visit    Video End Time:11:25 AM    Originating Location (pt. Location): Home    Distant Location (provider location):  [facility]     Platform used for Video Visit: Gogobot    .  ..

## 2022-07-19 ENCOUNTER — OFFICE VISIT (OUTPATIENT)
Dept: FAMILY MEDICINE | Facility: CLINIC | Age: 52
End: 2022-07-19
Payer: COMMERCIAL

## 2022-07-19 VITALS
HEIGHT: 62 IN | HEART RATE: 82 BPM | BODY MASS INDEX: 44.72 KG/M2 | TEMPERATURE: 97.7 F | WEIGHT: 243 LBS | OXYGEN SATURATION: 100 % | DIASTOLIC BLOOD PRESSURE: 88 MMHG | SYSTOLIC BLOOD PRESSURE: 116 MMHG

## 2022-07-19 DIAGNOSIS — Z51.81 MEDICATION MONITORING ENCOUNTER: ICD-10-CM

## 2022-07-19 DIAGNOSIS — Z86.69 HISTORY OF MIGRAINE WITH AURA: ICD-10-CM

## 2022-07-19 DIAGNOSIS — Z12.31 ENCOUNTER FOR SCREENING MAMMOGRAM FOR MALIGNANT NEOPLASM OF BREAST: ICD-10-CM

## 2022-07-19 DIAGNOSIS — Z13.6 CARDIOVASCULAR SCREENING; LDL GOAL LESS THAN 160: ICD-10-CM

## 2022-07-19 DIAGNOSIS — F32.0 MAJOR DEPRESSIVE DISORDER, SINGLE EPISODE, MILD (H): ICD-10-CM

## 2022-07-19 DIAGNOSIS — F98.8 ATTENTION DEFICIT DISORDER, UNSPECIFIED HYPERACTIVITY PRESENCE: ICD-10-CM

## 2022-07-19 DIAGNOSIS — Z12.11 SCREEN FOR COLON CANCER: ICD-10-CM

## 2022-07-19 DIAGNOSIS — K21.9 GASTROESOPHAGEAL REFLUX DISEASE WITHOUT ESOPHAGITIS: ICD-10-CM

## 2022-07-19 DIAGNOSIS — L05.91 PILONIDAL CYST: ICD-10-CM

## 2022-07-19 DIAGNOSIS — Z82.49 FAMILY HISTORY OF ABDOMINAL AORTIC ANEURYSM (AAA): ICD-10-CM

## 2022-07-19 DIAGNOSIS — E55.9 VITAMIN D DEFICIENCY: ICD-10-CM

## 2022-07-19 DIAGNOSIS — Z12.4 SCREENING FOR MALIGNANT NEOPLASM OF CERVIX: ICD-10-CM

## 2022-07-19 DIAGNOSIS — Z00.00 ROUTINE GENERAL MEDICAL EXAMINATION AT A HEALTH CARE FACILITY: Primary | ICD-10-CM

## 2022-07-19 DIAGNOSIS — I10 HYPERTENSION GOAL BP (BLOOD PRESSURE) < 140/90: ICD-10-CM

## 2022-07-19 DIAGNOSIS — Z13.820 SCREENING FOR OSTEOPOROSIS: ICD-10-CM

## 2022-07-19 DIAGNOSIS — J30.81 CAT ALLERGIES: ICD-10-CM

## 2022-07-19 DIAGNOSIS — F41.9 ANXIETY: ICD-10-CM

## 2022-07-19 LAB
ALBUMIN SERPL-MCNC: 3.9 G/DL (ref 3.4–5)
ALBUMIN UR-MCNC: NEGATIVE MG/DL
ALP SERPL-CCNC: 64 U/L (ref 40–150)
ALT SERPL W P-5'-P-CCNC: 28 U/L (ref 0–50)
ANION GAP SERPL CALCULATED.3IONS-SCNC: 7 MMOL/L (ref 3–14)
APPEARANCE UR: CLEAR
AST SERPL W P-5'-P-CCNC: 15 U/L (ref 0–45)
BILIRUB SERPL-MCNC: 0.5 MG/DL (ref 0.2–1.3)
BILIRUB UR QL STRIP: NEGATIVE
BUN SERPL-MCNC: 11 MG/DL (ref 7–30)
CALCIUM SERPL-MCNC: 9.5 MG/DL (ref 8.5–10.1)
CHLORIDE BLD-SCNC: 105 MMOL/L (ref 94–109)
CHOLEST SERPL-MCNC: 217 MG/DL
CK SERPL-CCNC: 57 U/L (ref 30–225)
CO2 SERPL-SCNC: 25 MMOL/L (ref 20–32)
COLOR UR AUTO: YELLOW
CREAT SERPL-MCNC: 0.58 MG/DL (ref 0.52–1.04)
CREAT UR-MCNC: 92 MG/DL
ERYTHROCYTE [DISTWIDTH] IN BLOOD BY AUTOMATED COUNT: 12.5 % (ref 10–15)
FASTING STATUS PATIENT QL REPORTED: YES
FSH SERPL IRP2-ACNC: 29.3 MIU/ML
GFR SERPL CREATININE-BSD FRML MDRD: >90 ML/MIN/1.73M2
GLUCOSE BLD-MCNC: 115 MG/DL (ref 70–99)
GLUCOSE UR STRIP-MCNC: NEGATIVE MG/DL
HCT VFR BLD AUTO: 40.4 % (ref 35–47)
HDLC SERPL-MCNC: 48 MG/DL
HGB BLD-MCNC: 13.5 G/DL (ref 11.7–15.7)
HGB UR QL STRIP: NEGATIVE
KETONES UR STRIP-MCNC: NEGATIVE MG/DL
LDLC SERPL CALC-MCNC: 148 MG/DL
LEUKOCYTE ESTERASE UR QL STRIP: NEGATIVE
LH SERPL-ACNC: 20.1 MIU/ML
MCH RBC QN AUTO: 28.5 PG (ref 26.5–33)
MCHC RBC AUTO-ENTMCNC: 33.4 G/DL (ref 31.5–36.5)
MCV RBC AUTO: 85 FL (ref 78–100)
MICROALBUMIN UR-MCNC: 5 MG/L
MICROALBUMIN/CREAT UR: 5.43 MG/G CR (ref 0–25)
NITRATE UR QL: NEGATIVE
NONHDLC SERPL-MCNC: 169 MG/DL
PH UR STRIP: 7 [PH] (ref 5–7)
PLATELET # BLD AUTO: 261 10E3/UL (ref 150–450)
POTASSIUM BLD-SCNC: 4.6 MMOL/L (ref 3.4–5.3)
PROT SERPL-MCNC: 7.9 G/DL (ref 6.8–8.8)
RBC # BLD AUTO: 4.74 10E6/UL (ref 3.8–5.2)
SODIUM SERPL-SCNC: 137 MMOL/L (ref 133–144)
SP GR UR STRIP: 1.02 (ref 1–1.03)
TRIGL SERPL-MCNC: 107 MG/DL
TSH SERPL DL<=0.005 MIU/L-ACNC: 1.16 MU/L (ref 0.4–4)
UROBILINOGEN UR STRIP-ACNC: 0.2 E.U./DL
WBC # BLD AUTO: 5 10E3/UL (ref 4–11)

## 2022-07-19 PROCEDURE — 81003 URINALYSIS AUTO W/O SCOPE: CPT | Performed by: FAMILY MEDICINE

## 2022-07-19 PROCEDURE — 82306 VITAMIN D 25 HYDROXY: CPT | Performed by: FAMILY MEDICINE

## 2022-07-19 PROCEDURE — 99214 OFFICE O/P EST MOD 30 MIN: CPT | Mod: 25 | Performed by: FAMILY MEDICINE

## 2022-07-19 PROCEDURE — 80053 COMPREHEN METABOLIC PANEL: CPT | Performed by: FAMILY MEDICINE

## 2022-07-19 PROCEDURE — 84443 ASSAY THYROID STIM HORMONE: CPT | Performed by: FAMILY MEDICINE

## 2022-07-19 PROCEDURE — 82550 ASSAY OF CK (CPK): CPT | Performed by: FAMILY MEDICINE

## 2022-07-19 PROCEDURE — 83002 ASSAY OF GONADOTROPIN (LH): CPT | Performed by: FAMILY MEDICINE

## 2022-07-19 PROCEDURE — 99396 PREV VISIT EST AGE 40-64: CPT | Performed by: FAMILY MEDICINE

## 2022-07-19 PROCEDURE — 36415 COLL VENOUS BLD VENIPUNCTURE: CPT | Performed by: FAMILY MEDICINE

## 2022-07-19 PROCEDURE — 82043 UR ALBUMIN QUANTITATIVE: CPT | Performed by: FAMILY MEDICINE

## 2022-07-19 PROCEDURE — 83001 ASSAY OF GONADOTROPIN (FSH): CPT | Performed by: FAMILY MEDICINE

## 2022-07-19 PROCEDURE — 80061 LIPID PANEL: CPT | Performed by: FAMILY MEDICINE

## 2022-07-19 PROCEDURE — 85027 COMPLETE CBC AUTOMATED: CPT | Performed by: FAMILY MEDICINE

## 2022-07-19 RX ORDER — ALBUTEROL SULFATE 90 UG/1
2 AEROSOL, METERED RESPIRATORY (INHALATION) EVERY 6 HOURS PRN
Qty: 8 G | Refills: 3 | Status: SHIPPED | OUTPATIENT
Start: 2022-07-19

## 2022-07-19 ASSESSMENT — ENCOUNTER SYMPTOMS
HEMATOCHEZIA: 0
HEMATURIA: 0
ABDOMINAL PAIN: 0
CHILLS: 0

## 2022-07-19 ASSESSMENT — ANXIETY QUESTIONNAIRES
5. BEING SO RESTLESS THAT IT IS HARD TO SIT STILL: NOT AT ALL
2. NOT BEING ABLE TO STOP OR CONTROL WORRYING: NOT AT ALL
IF YOU CHECKED OFF ANY PROBLEMS ON THIS QUESTIONNAIRE, HOW DIFFICULT HAVE THESE PROBLEMS MADE IT FOR YOU TO DO YOUR WORK, TAKE CARE OF THINGS AT HOME, OR GET ALONG WITH OTHER PEOPLE: NOT DIFFICULT AT ALL
GAD7 TOTAL SCORE: 0
6. BECOMING EASILY ANNOYED OR IRRITABLE: NOT AT ALL
3. WORRYING TOO MUCH ABOUT DIFFERENT THINGS: NOT AT ALL
1. FEELING NERVOUS, ANXIOUS, OR ON EDGE: NOT AT ALL
7. FEELING AFRAID AS IF SOMETHING AWFUL MIGHT HAPPEN: NOT AT ALL
GAD7 TOTAL SCORE: 0

## 2022-07-19 ASSESSMENT — ASTHMA QUESTIONNAIRES: ACT_TOTALSCORE: 25

## 2022-07-19 ASSESSMENT — PATIENT HEALTH QUESTIONNAIRE - PHQ9
5. POOR APPETITE OR OVEREATING: NOT AT ALL
SUM OF ALL RESPONSES TO PHQ QUESTIONS 1-9: 4

## 2022-07-19 NOTE — PROGRESS NOTES
[facility]  [place]    SUBJECTIVE    CC: Susan Barr is an 51 year old woman who presents for preventive health visit.     Patient has been advised of split billing requirements and indicates understanding: Yes     Healthy Habits:     Getting at least 3 servings of Calcium per day:  NO    Bi-annual eye exam:  Yes    Dental care twice a year:  Yes    Sleep apnea or symptoms of sleep apnea:  None    Diet:  Regular (no restrictions)    Frequency of exercise:  None    Taking medications regularly:  Yes    Medication side effects:  None    PHQ-2 Total Score: 1    Additional concerns today:  No    Recent COVID - 7/12 with SX/Positive test - decreased energy/tired/cough - improving    Hypertension Follow-up - lower extremity swelling with recent travel, resolved      Do you check your blood pressure regularly outside of the clinic? Yes     Are you following a low salt diet? Yes    Are your blood pressures ever more than 140 on the top number (systolic) OR more   than 90 on the bottom number (diastolic), for example 140/90? No    BP Readings from Last 3 Encounters:   07/19/22 116/88   03/18/22 130/84   09/14/21 138/88     Creatinine   Date Value Ref Range Status   10/12/2020 0.71 0.52 - 1.04 mg/dL Final     GFR Estimate   Date Value Ref Range Status   10/12/2020 >90 >60 mL/min/[1.73_m2] Final     Comment:     Non  GFR Calc  Starting 12/18/2018, serum creatinine based estimated GFR (eGFR) will be   calculated using the Chronic Kidney Disease Epidemiology Collaboration   (CKD-EPI) equation.       Depression / Anxiety / ADD    PHQ = 4, ANDREW = 0      How are you doing with your depression since your last visit? Improved     How are you doing with your anxiety since your last visit?  Improved     Are you having other symptoms that might be associated with depression or anxiety? No    Have you had a significant life event? No     Do you have any concerns with your use of alcohol or other drugs?  No    Social History     Tobacco Use     Smoking status: Never Smoker     Smokeless tobacco: Never Used   Vaping Use     Vaping Use: Never used   Substance Use Topics     Alcohol use: Not Currently     Alcohol/week: 0.0 - 1.0 standard drinks     Comment: rare     Drug use: No     PHQ 11/19/2020 12/14/2020 6/15/2022   PHQ-9 Total Score 16 2 3   Q9: Thoughts of better off dead/self-harm past 2 weeks More than half the days Not at all Not at all     ANDREW-7 SCORE 10/12/2020 11/19/2020 12/14/2020   Total Score - - 0 (minimal anxiety)   Total Score 0 8 0     Last PHQ-9 6/15/2022   1.  Little interest or pleasure in doing things 0   2.  Feeling down, depressed, or hopeless 1   3.  Trouble falling or staying asleep, or sleeping too much 0   4.  Feeling tired or having little energy 1   5.  Poor appetite or overeating 0   6.  Feeling bad about yourself 1   7.  Trouble concentrating 0   8.  Moving slowly or restless 0   Q9: Thoughts of better off dead/self-harm past 2 weeks 0   PHQ-9 Total Score 3   Difficulty at work, home, or with people -     ANDREW-7  12/14/2020   1. Feeling nervous, anxious, or on edge 0   2. Not being able to stop or control worrying 0   3. Worrying too much about different things 0   4. Trouble relaxing 0   5. Being so restless that it is hard to sit still 0   6. Becoming easily annoyed or irritable 0   7. Feeling afraid, as if something awful might happen 0   ANDREW-7 Total Score 0   If you checked any problems, how difficult have they made it for you to do your work, take care of things at home, or get along with other people? -     Suicide Assessment Five-step Evaluation and Treatment (SAFE-T)      GERD - occasional, OTC    Right wrist / Bilateral knee pain - voltaren    Asthma Follow-Up    Was ACT completed today?  Yes    ACT Total Scores 7/19/2022   ACT TOTAL SCORE (Goal Greater than or Equal to 20) 25   In the past 12 months, how many times did you visit the emergency room for your asthma without being  admitted to the hospital? 0   In the past 12 months, how many times were you hospitalized overnight because of your asthma? 0          How many days per week do you miss taking your asthma controller medication?  I do not have an asthma controller medication    Please describe any recent triggers for your asthma: animal dander    Have you had any Emergency Room Visits, Urgent Care Visits, or Hospital Admissions since your last office visit?  No    Abuse: Current or Past (Physical, Sexual or Emotional) - No  Do you feel safe in your environment? Yes    Have you ever done Advance Care Planning? (For example, a Health Directive, POLST, or a discussion with a medical provider or your loved ones about your wishes): No, advance care planning information given to patient to review.  Patient declined advance care planning discussion at this time.    Social History     Tobacco Use     Smoking status: Never Smoker     Smokeless tobacco: Never Used   Substance Use Topics     Alcohol use: Not Currently     Alcohol/week: 0.0 - 1.0 standard drinks     Comment: rare     If you drink alcohol do you typically have >3 drinks per day or >7 drinks per week? No    Alcohol Use 7/19/2022   Prescreen: >3 drinks/day or >7 drinks/week? No   Prescreen: >3 drinks/day or >7 drinks/week? -   No flowsheet data found.    Reviewed orders with patient.  Reviewed health maintenance and updated orders accordingly - Yes    Breast Cancer Screening:    FHS-7:   Breast CA Risk Assessment (FHS-7) 7/19/2022   Did any of your first-degree relatives have breast or ovarian cancer? Yes   Did any of your relatives have bilateral breast cancer? Unknown     Mother Breast Cancer 80's  Mammogram Screening: Recommended annual mammography  Pertinent mammograms are reviewed under the imaging tab.    History of abnormal Pap smear: NO - age 30-65 PAP every 5 years with negative HPV co-testing recommended  PAP / HPV Latest Ref Rng & Units 10/12/2020 12/22/2015 12/23/2011    PAP (Historical) - NIL NIL NIL   HPV16 NEG:Negative Negative Negative -   HPV18 NEG:Negative Negative Negative -   HRHPV NEG:Negative Negative Negative -     Reviewed and updated as needed this visit by clinical staff   Tobacco  Allergies  Meds   Med Hx  Surg Hx  Fam Hx  Soc Hx        Reviewed and updated as needed this visit by Provider                   Review of Systems   Constitutional: Negative for chills.   HENT: Negative for congestion.    Cardiovascular: Negative for chest pain.   Gastrointestinal: Negative for abdominal pain and hematochezia.   Genitourinary: Negative for hematuria.     ROS as above, otherwise negative    Health Maintenance     Colonoscopy:  due   FIT:  NA              Mammogram:  ordered              PAP:  UTD, last 10/2020   DEXA:  ordered    Health Maintenance Due   Topic Date Due     COLORECTAL CANCER SCREENING  Never done     MAMMO SCREENING  12/30/2012     ZOSTER IMMUNIZATION (1 of 2) Never done     COVID-19 Vaccine (3 - Booster for Moderna series) 10/20/2021     DTAP/TDAP/TD IMMUNIZATION (3 - Td or Tdap) 12/23/2021       Current Problem List    Patient Active Problem List   Diagnosis     Migraine with aura and without status migrainosus, not intractable     Obesity     PCOS (polycystic ovarian syndrome)     Major depressive disorder, single episode, mild (H)     Symptomatic states associated with artificial menopause     Tension headache     GERD (gastroesophageal reflux disease)     ADD (attention deficit disorder)     CARDIOVASCULAR SCREENING; LDL GOAL LESS THAN 160     History of Lyme disease     Marcellus-Barr infection- hx of in past      Vitamin D deficiency     Symptomatic premature menopause     History of migraine with aura     Morbid obesity (H)     Hypertension goal BP (blood pressure) < 140/90     Anxiety       Past Medical History    Past Medical History:   Diagnosis Date     ADD (attention deficit disorder)      Anxiety 12/15/2020     Asthma     allergy related      Excessive menstruation     resolved with hysterectomy/bso 10/08      GERD (gastroesophageal reflux disease)      Hypertension goal BP (blood pressure) < 140/90      Lyme disease 2005     Major depressive disorder, single episode, mild (H) 05/2009     Obesity, unspecified      PCOS (polycystic ovarian syndrome) 2004    dr aragon/jameson - watching DM       Past Surgical History    Past Surgical History:   Procedure Laterality Date     CYSTECTOMY PILONIDAL  1984     HYSTERECTOMY, SUPRACERVICAL LAPAROSCOPIC  10/2008    supracevical hysterectomy/BSO - Dr Martini     LAPAROSCOPIC CHOLECYSTECTOMY  10/18/2011    Dr Young     LAPAROSCOPIC TUBAL LIGATION  1991     TONSILLECTOMY  1976       Current Medications    Current Outpatient Medications   Medication Sig Dispense Refill     albuterol (PROAIR HFA/PROVENTIL HFA/VENTOLIN HFA) 108 (90 Base) MCG/ACT inhaler Inhale 2 puffs into the lungs every 6 hours as needed for shortness of breath / dyspnea or wheezing 8 g 3     metoprolol succinate ER (TOPROL XL) 50 MG 24 hr tablet Take 1 tablet (50 mg) by mouth daily 90 tablet 3       Allergies    Allergies   Allergen Reactions     Cats Shortness Of Breath     Wheezing     Adhesive Tape Rash     bandaids     No Known Drug Allergies        Immunizations    Immunization History   Administered Date(s) Administered     COVID-19,PF,Moderna 04/22/2021, 05/20/2021     HepB 09/03/1993     MMR 09/03/1993     Poliovirus, inactivated (IPV) 09/03/1993     TD (ADULT, 7+) 06/16/2000     TDAP Vaccine (Boostrix) 12/23/2011       Family History    Family History   Problem Relation Age of Onset     Hypertension Mother      Breast Cancer Mother         70's.        Alzheimer Disease Mother      Hyperlipidemia Mother      Heart Disease Father         Heart disease     Diabetes Father         Adult onset     Lung Cancer Father         smoker     Obesity Father      Thyroid Disease Sister         Hypothyroidism     Mental Illness Sister          schizophrenia     Diabetes Sister      Hypertension Brother      Abdominal Aortic Aneurysm Brother      Diabetes Paternal Grandmother         adult onset     Gastrointestinal Disease Paternal Grandfather         Kidney failure       Social History    Social History     Socioeconomic History     Marital status:      Spouse name: Cory     Number of children: 2     Years of education: 14     Highest education level: Not on file   Occupational History     Not on file   Tobacco Use     Smoking status: Never Smoker     Smokeless tobacco: Never Used   Vaping Use     Vaping Use: Never used   Substance and Sexual Activity     Alcohol use: Not Currently     Alcohol/week: 0.0 - 1.0 standard drinks     Comment: rare     Drug use: No     Sexual activity: Yes     Partners: Male     Birth control/protection: Surgical     Comment: HYSTERECTOMY AND PROSTYTECTOMY   Other Topics Concern     Parent/sibling w/ CABG, MI or angioplasty before 65F 55M? Yes     Comment: My Father had a heart attach in his 40s      Service Not Asked     Blood Transfusions Not Asked     Caffeine Concern Yes     Comment: 1 to 2 cups of coffee day.     Occupational Exposure Not Asked     Hobby Hazards Not Asked     Sleep Concern Yes     Comment: Excessive daytime drowsiness.     Stress Concern Not Asked     Weight Concern Not Asked     Special Diet Not Asked     Back Care Not Asked     Exercise Not Asked     Bike Helmet Not Asked     Seat Belt Not Asked     Self-Exams Yes   Social History Narrative     Not on file     Social Determinants of Health     Financial Resource Strain: Not on file   Food Insecurity: Not on file   Transportation Needs: Not on file   Physical Activity: Not on file   Stress: Not on file   Social Connections: Not on file   Intimate Partner Violence: Not on file   Housing Stability: Not on file       ROS    CONSTITUTIONAL: NEGATIVE for fever, chills, change in weight  INTEGUMENTARY/SKIN: NEGATIVE for worrisome rashes, moles  "or lesions  EYES: NEGATIVE for vision changes or irritation  ENT/MOUTH: NEGATIVE for ear, mouth and throat problems  RESP: NEGATIVE for significant cough or SOB  BREAST: NEGATIVE for masses, tenderness or discharge  CV: NEGATIVE for chest pain, palpitations or peripheral edema  GI: NEGATIVE for nausea, abdominal pain, heartburn, or change in bowel habits  : NEGATIVE for frequency, dysuria, or hematuria  MUSCULOSKELETAL: NEGATIVE for significant arthralgias or myalgia  NEURO: NEGATIVE for weakness, dizziness or paresthesias  ENDOCRINE: NEGATIVE for temperature intolerance, skin/hair changes  HEME: NEGATIVE for bleeding problems  PSYCHIATRIC: NEGATIVE for changes in mood or affect    OBJECTIVE    /88 (BP Location: Left arm, Patient Position: Sitting, Cuff Size: Adult Large)   Pulse 82   Temp 97.7  F (36.5  C) (Tympanic)   Ht 1.575 m (5' 2\")   Wt 110.2 kg (243 lb)   LMP 03/09/2007   SpO2 100%   Breastfeeding No   BMI 44.45 kg/m    EXAM:  GENERAL: healthy, alert and no distress  EYES: Eyes grossly normal to inspection, PERRL and conjunctivae and sclerae normal  HENT: ear canals and TM's normal, nose and mouth without ulcers or lesions  NECK: no adenopathy, no asymmetry, masses, or scars and thyroid normal to palpation  RESP: lungs clear to auscultation - no rales, rhonchi or wheezes  BREAST: pt declines  CV: regular rate and rhythm, normal S1 S2, no S3 or S4, no murmur, click or rub, no peripheral edema and peripheral pulses strong  ABDOMEN: soft, nontender, no hepatosplenomegaly, no masses and bowel sounds normal   (female): pt declines  MS: no gross musculoskeletal defects noted, no edema  SKIN: no suspicious lesions or rashes  NEURO: Normal strength and tone, mentation intact and speech normal  PSYCH: mentation appears normal, affect normal/bright  LYMPH: no cervical, supraclavicular, axillary, or inguinal adenopathy    DIAGNOSTICS/PROCEDURES    Pending    ASSESSMENT      ICD-10-CM    1. Routine " general medical examination at a health care facility  Z00.00 Comprehensive metabolic panel     Lipid panel reflex to direct LDL Fasting     CBC with platelets     CK total     UA reflex to Microscopic and Culture     Albumin Random Urine Quantitative with Creat Ratio     TSH with free T4 reflex     Fecal colorectal cancer screen FIT     Vitamin D Deficiency     REVIEW OF HEALTH MAINTENANCE PROTOCOL ORDERS     Luteinizing Hormone, Adult     Follicle stimulating hormone   2. Major depressive disorder, single episode, mild (H)  F32.0 TSH with free T4 reflex     REVIEW OF HEALTH MAINTENANCE PROTOCOL ORDERS     OFFICE/OUTPT VISIT,EST,LEVL IV   3. Anxiety  F41.9 TSH with free T4 reflex     REVIEW OF HEALTH MAINTENANCE PROTOCOL ORDERS     OFFICE/OUTPT VISIT,EST,LEVL IV   4. Attention deficit disorder, unspecified hyperactivity presence  F98.8 TSH with free T4 reflex     REVIEW OF HEALTH MAINTENANCE PROTOCOL ORDERS     OFFICE/OUTPT VISIT,EST,LEVL IV   5. Hypertension goal BP (blood pressure) < 140/90  I10 Comprehensive metabolic panel     UA reflex to Microscopic and Culture     Albumin Random Urine Quantitative with Creat Ratio     REVIEW OF HEALTH MAINTENANCE PROTOCOL ORDERS     OFFICE/OUTPT VISIT,EST,LEVL IV   6. CARDIOVASCULAR SCREENING; LDL GOAL LESS THAN 160  Z13.6 Comprehensive metabolic panel     Lipid panel reflex to direct LDL Fasting     CK total     REVIEW OF HEALTH MAINTENANCE PROTOCOL ORDERS     OFFICE/OUTPT VISIT,EST,LEVL IV   7. Vitamin D deficiency  E55.9 Comprehensive metabolic panel     Vitamin D Deficiency     REVIEW OF HEALTH MAINTENANCE PROTOCOL ORDERS     OFFICE/OUTPT VISIT,EST,LEVL IV   8. Gastroesophageal reflux disease without esophagitis  K21.9 CBC with platelets     REVIEW OF HEALTH MAINTENANCE PROTOCOL ORDERS     OFFICE/OUTPT VISIT,EST,LEVL IV   9. Pilonidal cyst  L05.91 Adult General Surg Referral     OFFICE/OUTPT VISIT,EST,LEVL IV   10. Cat allergies  J30.81 REVIEW OF HEALTH MAINTENANCE  PROTOCOL ORDERS     albuterol (PROAIR HFA/PROVENTIL HFA/VENTOLIN HFA) 108 (90 Base) MCG/ACT inhaler     Asthma Action Plan (AAP)     OFFICE/OUTPT VISIT,EST,LEVL IV   11. History of migraine with aura  Z86.69 REVIEW OF HEALTH MAINTENANCE PROTOCOL ORDERS     OFFICE/OUTPT VISIT,EST,LEVL IV   12. Family history of abdominal aortic aneurysm (AAA)  Z82.49 REVIEW OF HEALTH MAINTENANCE PROTOCOL ORDERS     US Abdominal Aorta Imaging     OFFICE/OUTPT VISIT,EST,LEVL IV   13. Screen for colon cancer  Z12.11 Fecal colorectal cancer screen FIT     REVIEW OF HEALTH MAINTENANCE PROTOCOL ORDERS     Adult GI  Referral - Procedure Only   14. Encounter for screening mammogram for malignant neoplasm of breast  Z12.31 MA SCREENING DIGITAL BILAT - Future  (s+30)     REVIEW OF HEALTH MAINTENANCE PROTOCOL ORDERS   15. Screening for osteoporosis  Z13.820 REVIEW OF HEALTH MAINTENANCE PROTOCOL ORDERS     DX Hip/Pelvis/Spine   16. Screening for malignant neoplasm of cervix  Z12.4 REVIEW OF HEALTH MAINTENANCE PROTOCOL ORDERS   17. Medication monitoring encounter  Z51.81 Comprehensive metabolic panel     Lipid panel reflex to direct LDL Fasting     CBC with platelets     CK total     UA reflex to Microscopic and Culture     Albumin Random Urine Quantitative with Creat Ratio     TSH with free T4 reflex     Vitamin D Deficiency     REVIEW OF HEALTH MAINTENANCE PROTOCOL ORDERS       PLAN    Discussed treatment/modality options, including risk and benefits, she desires:    advised alcohol consumption 1oz per day or less, advised 1 multivitamin per day, advised calcium 0303-3335 mg/d and Vitamin D 800-1200 IU/d, advised dentist every 6 months, advised diet, exercise, and weight loss, advised opthalmologist every 1-2 years, advised self breast exam q month, further health care maintenance, further lab(s), immunization(s), medication refill(s) and observation    All diagnosis above reviewed and noted above, otherwise stable.      See [facility]  "orders for further details.      1) med refills, SCT/AAP    2) labs pending    3) immunizations - consider flu/COVID/Shingrix/Tdap    4) Abd US - FH AAA    5) Mammogram    6) DEXA    7) general surgery consult - pilonidal cyst    8) colonoscopy/DEXA/Mammo    9) heat/ice/stretching/voltaren/tylenol/FSOC    Return in about 3 months (around 10/19/2022) for Medication Recheck Visit, Follow Up Chronic.    Health Maintenance Due   Topic Date Due     COLORECTAL CANCER SCREENING  Never done     MAMMO SCREENING  12/30/2012     ZOSTER IMMUNIZATION (1 of 2) Never done     COVID-19 Vaccine (3 - Booster for Moderna series) 10/20/2021     DTAP/TDAP/TD IMMUNIZATION (3 - Td or Tdap) 12/23/2021       COUNSELING    Reviewed preventive health counseling, as reflected in patient instructions    BP Readings from Last 1 Encounters:   07/19/22 116/88     Estimated body mass index is 44.45 kg/m  as calculated from the following:    Height as of this encounter: 1.575 m (5' 2\").    Weight as of this encounter: 110.2 kg (243 lb).      Weight management plan: diet and exercise     reports that she has never smoked. She has never used smokeless tobacco.           Devon Syed MD, [facility]  [address] [place] [id number]  [email or web address]   Office: [phone]  Fax: [phone]  Pager: [phone]       "

## 2022-07-19 NOTE — LETTER
My Asthma Action Plan    Name: Susan Barr   YOB: 1970  Date: 7/19/2022   My doctor: Devon Syed MD   My clinic: [facility] [place]        My Rescue Medicine:   Albuterol inhaler (Proair/Ventolin/Proventil HFA)  2-4 puffs EVERY 4 HOURS as needed. Use a spacer if recommended by your provider.   My Asthma Severity:   Intermittent / Exercise Induced  Know your asthma triggers: cats  animal dander          GREEN ZONE   Good Control    I feel good    No cough or wheeze    Can work, sleep and play without asthma symptoms       Take your asthma control medicine every day.     1. If exercise triggers your asthma, take your rescue medication    15 minutes before exercise or sports, and    During exercise if you have asthma symptoms  2. Spacer to use with inhaler: If you have a spacer, make sure to use it with your inhaler             YELLOW ZONE Getting Worse  I have ANY of these:    I do not feel good    Cough or wheeze    Chest feels tight    Wake up at night   1. Keep taking your Green Zone medications  2. Start taking your rescue medicine:    every 20 minutes for up to 1 hour. Then every 4 hours for 24-48 hours.  3. If you stay in the Yellow Zone for more than 12-24 hours, contact your doctor.  4. If you do not return to the Green Zone in 12-24 hours or you get worse, start taking your oral steroid medicine if prescribed by your provider.           RED ZONE Medical Alert - Get Help  I have ANY of these:    I feel awful    Medicine is not helping    Breathing getting harder    Trouble walking or talking    Nose opens wide to breathe       1. Take your rescue medicine NOW  2. If your provider has prescribed an oral steroid medicine, start taking it NOW  3. Call your doctor NOW  4. If you are still in the Red Zone after 20 minutes and you have not reached your doctor:    Take your rescue medicine again and    Call 911 or go to the emergency room right away    See your regular doctor within  2 weeks of an Emergency Room or Urgent Care visit for follow-up treatment.          Annual Reminders:  Meet with Asthma Educator,  Flu Shot in the Fall, consider Pneumonia Vaccination for patients with asthma (aged 19 and older).    Pharmacy:    [facility] [place] [address]  [id number] [place], [place] - [address] [place] [address]    Electronically signed by Devon Syed MD   Date: 07/19/22                    Asthma Triggers  How To Control Things That Make Your Asthma Worse    Triggers are things that make your asthma worse.  Look at the list below to help you find your triggers and   what you can do about them. You can help prevent asthma flare-ups by staying away from your triggers.      Trigger                                                          What you can do   Cigarette Smoke  Tobacco smoke can make asthma worse. Do not allow smoking in your home, car or around you.  Be sure no one smokes at a child s day care or school.  If you smoke, ask your health care provider for ways to help you quit.  Ask family members to quit too.  Ask your health care provider for a referral to Quit Plan to help you quit smoking, or call 5-350-050-PLAN.     Colds, Flu, Bronchitis  These are common triggers of asthma. Wash your hands often.  Don t touch your eyes, nose or mouth.  Get a flu shot every year.     Dust Mites  These are tiny bugs that live in cloth or carpet. They are too small to see. Wash sheets and blankets in hot water every week.   Encase pillows and mattress in dust mite proof covers.  Avoid having carpet if you can. If you have carpet, vacuum weekly.   Use a dust mask and HEPA vacuum.   Pollen and Outdoor Mold  Some people are allergic to trees, grass, or weed pollen, or molds. Try to keep your windows closed.  Limit time out doors when pollen count is high.   Ask you health care provider about taking medicine during allergy season.     Animal Dander  Some people are  allergic to skin flakes, urine or saliva from pets with fur or feathers. Keep pets with fur or feathers out of your home.    If you can t keep the pet outdoors, then keep the pet out of your bedroom.  Keep the bedroom door closed.  Keep pets off cloth furniture and away from stuffed toys.     Mice, Rats, and Cockroaches  Some people are allergic to the waste from these pests.   Cover food and garbage.  Clean up spills and food crumbs.  Store grease in the refrigerator.   Keep food out of the bedroom.   Indoor Mold  This can be a trigger if your home has high moisture. Fix leaking faucets, pipes, or other sources of water.   Clean moldy surfaces.  Dehumidify basement if it is damp and smelly.   Smoke, Strong Odors, and Sprays  These can reduce air quality. Stay away from strong odors and sprays, such as perfume, powder, hair spray, paints, smoke incense, paint, cleaning products, candles and new carpet.   Exercise or Sports  Some people with asthma have this trigger. Be active!  Ask your doctor about taking medicine before sports or exercise to prevent symptoms.    Warm up for 5-10 minutes before and after sports or exercise.     Other Triggers of Asthma  Cold air:  Cover your nose and mouth with a scarf.  Sometimes laughing or crying can be a trigger.  Some medicines and food can trigger asthma.

## 2022-07-20 LAB — DEPRECATED CALCIDIOL+CALCIFEROL SERPL-MC: 24 UG/L (ref 20–75)

## 2022-07-26 DIAGNOSIS — E66.01 MORBID OBESITY (H): ICD-10-CM

## 2022-07-26 DIAGNOSIS — E55.9 VITAMIN D DEFICIENCY: Primary | ICD-10-CM

## 2022-07-26 DIAGNOSIS — E78.2 MIXED HYPERLIPIDEMIA: ICD-10-CM

## 2022-07-26 NOTE — PROGRESS NOTES
Orders per result note    Deandre DUFF RN   [facility]

## 2022-07-28 ENCOUNTER — HOSPITAL ENCOUNTER (OUTPATIENT)
Dept: ULTRASOUND IMAGING | Facility: CLINIC | Age: 52
Discharge: HOME | End: 2022-07-28
Attending: FAMILY MEDICINE | Admitting: FAMILY MEDICINE
Payer: COMMERCIAL

## 2022-07-28 DIAGNOSIS — Z82.49 FAMILY HISTORY OF ABDOMINAL AORTIC ANEURYSM (AAA): ICD-10-CM

## 2022-07-28 PROCEDURE — 76775 US EXAM ABDO BACK WALL LIM: CPT

## 2022-08-04 ENCOUNTER — HOSPITAL ENCOUNTER (OUTPATIENT)
Dept: MAMMOGRAPHY | Facility: CLINIC | Age: 52
Discharge: HOME | End: 2022-08-04
Attending: FAMILY MEDICINE | Admitting: FAMILY MEDICINE
Payer: COMMERCIAL

## 2022-08-04 DIAGNOSIS — Z12.31 ENCOUNTER FOR SCREENING MAMMOGRAM FOR MALIGNANT NEOPLASM OF BREAST: ICD-10-CM

## 2022-08-04 PROCEDURE — 77067 SCR MAMMO BI INCL CAD: CPT

## 2022-10-22 ENCOUNTER — HEALTH MAINTENANCE LETTER (OUTPATIENT)
Age: 52
End: 2022-10-22

## 2023-04-19 ENCOUNTER — VIRTUAL VISIT (OUTPATIENT)
Dept: FAMILY MEDICINE | Facility: OTHER | Age: 53
End: 2023-04-19
Payer: COMMERCIAL

## 2023-04-19 DIAGNOSIS — R73.03 PREDIABETES: ICD-10-CM

## 2023-04-19 DIAGNOSIS — E66.01 MORBID OBESITY (H): ICD-10-CM

## 2023-04-19 DIAGNOSIS — I10 HYPERTENSION GOAL BP (BLOOD PRESSURE) < 130/80: Primary | ICD-10-CM

## 2023-04-19 PROCEDURE — 99214 OFFICE O/P EST MOD 30 MIN: CPT | Mod: VID | Performed by: PHYSICIAN ASSISTANT

## 2023-04-19 RX ORDER — LISINOPRIL 10 MG/1
10 TABLET ORAL DAILY
Qty: 30 TABLET | Refills: 1 | Status: SHIPPED | OUTPATIENT
Start: 2023-04-19 | End: 2023-05-26

## 2023-04-19 ASSESSMENT — PATIENT HEALTH QUESTIONNAIRE - PHQ9
SUM OF ALL RESPONSES TO PHQ QUESTIONS 1-9: 6
10. IF YOU CHECKED OFF ANY PROBLEMS, HOW DIFFICULT HAVE THESE PROBLEMS MADE IT FOR YOU TO DO YOUR WORK, TAKE CARE OF THINGS AT HOME, OR GET ALONG WITH OTHER PEOPLE: NOT DIFFICULT AT ALL
SUM OF ALL RESPONSES TO PHQ QUESTIONS 1-9: 6

## 2023-04-19 NOTE — PROGRESS NOTES
Sharon is a 52 year old who is being evaluated via a billable video visit.      How would you like to obtain your AVS? MyChart  If the video visit is dropped, the invitation should be resent by: Text to cell phone: [phone]  Will anyone else be joining your video visit? No    Assessment & Plan     ICD-10-CM    1. Hypertension goal BP (blood pressure) < 130/80  I10 lisinopril (ZESTRIL) 10 MG tablet     Basic metabolic panel  (Ca, Cl, CO2, Creat, Gluc, K, Na, BUN)      2. Prediabetes  R73.03 Basic metabolic panel  (Ca, Cl, CO2, Creat, Gluc, K, Na, BUN)     Hemoglobin A1c      3. Morbid obesity (H)  E66.01         1. HTN   - Patient reports started on Metoprolol about 1 year ago due to HTN, runs strongly in her family, was started because history of migraines but these are few for her (mainly were before she had hysterectomy)   - She reports Metoprolol never really lowered BP and gave her leg pain   - She tried stopping it and leg pain did improve   - Recommend discontinuing mediation   - Recommend Lisinopril      Discussed ACE inhibitor medication use and side effects   - Will start at 10 mg   - Reviewed use and side effects  - Continue to monitor BP daily and keep a log   - Patient is to contact me in 1-2 weeks if BP not under goal of <130/80   - Discussed need to recheck BMP after on medication, orders placed   - Recheck with provider in 3-4 weeks     2. Prediabetes & Weight   - Elevated glucose at physical last year, reports ongoing issue for years   - Frustrated with her weight      Recommend updating labs including A1C, discussed if in the prediabetes range (was not in this range with last A1C in 2020) then could consider medication to lower BG's as well as weight      Also discussed possible referral to weight management      For now, will work on her BP and plan to follow up once labs are completed       Review of the result(s) of each unique test - See list       7/19/22 - CMP, lipid       10/12/20 - A1C    Diagnosis or treatment significantly limited by social determinants of health - None     25 minutes spent on the date of the encounter doing chart review, history and exam, documentation and further activities as noted above    The patient indicates understanding of these issues and agrees with the plan.    NATALEE Torres [facility] LEIF Herrera is a 52 year old, presenting for the following health issues:  Hypertension        4/19/2023     9:39 AM   Additional Questions   Roomed by Fish BRANCH   Accompanied by N/A         4/19/2023     9:39 AM   Patient Reported Additional Medications   Patient reports taking the following new medications None     History of Present Illness       Hypertension: She presents for follow up of hypertension.  She does check blood pressure  regularly outside of the clinic. Outside blood pressures have been over 140/90. She follows a low salt diet.     She eats 2-3 servings of fruits and vegetables daily.She consumes 0 sweetened beverage(s) daily.She exercises with enough effort to increase her heart rate 9 or less minutes per day.  She exercises with enough effort to increase her heart rate 3 or less days per week.   She is taking medications regularly.    Today's PHQ-9         PHQ-9 Total Score: 6    PHQ-9 Q9 Thoughts of better off dead/self-harm past 2 weeks :   Not at all    How difficult have these problems made it for you to do your work, take care of things at home, or get along with other people: Not difficult at all       Medication Followup of Toporol    Taking Medication as prescribed: yes    Side Effects:  Leg pain, joint swelling every time she takes it    Medication Helping Symptoms:  NO-Not lowering bp    - BP medication not working   - So stopped it, tried a natural route, did not help, feels horrible   - BP very high - 160/102      Headaches   - not even a year been diagnosed   - Did this medication because history  of migraines   - Never below 140 systolic   - Leg pain did improve off metoprolol   - Very sensitive to medications     - Struggled with weight for whole life      No-carbs and keto are the only thing that helps, can't do that long term   - PCOS before hysterectomy, didn't tolerate metformin at that time (GI side effects)      Glucose always been a little high       Has a meter, checking it       Review of Systems   Constitutional, HEENT, cardiovascular, pulmonary, gi and gu systems are negative, except as otherwise noted.      Objective       Vitals:  No vitals were obtained today due to virtual visit.    Physical Exam   GENERAL: Healthy, alert and no distress  EYES: Eyes grossly normal to inspection.  No discharge or erythema, or obvious scleral/conjunctival abnormalities.  RESP: No audible wheeze, cough, or visible cyanosis.  No visible retractions or increased work of breathing.    SKIN: Visible skin clear. No significant rash, abnormal pigmentation or lesions.  NEURO: Cranial nerves grossly intact.  Mentation and speech appropriate for age.  PSYCH: Mentation appears normal, affect normal/bright, judgement and insight intact, normal speech and appearance well-groomed.    Diagnostics: Reviewed in Epic         Video-Visit Details    Type of service:  Video Visit   Originating Location (pt. Location): Home  Distant Location (provider location):  Off-site  Platform used for Video Visit: [employer]

## 2023-05-23 ENCOUNTER — LAB (OUTPATIENT)
Dept: LAB | Facility: CLINIC | Age: 53
End: 2023-05-23
Payer: COMMERCIAL

## 2023-05-23 DIAGNOSIS — R73.03 PREDIABETES: ICD-10-CM

## 2023-05-23 DIAGNOSIS — I10 HYPERTENSION GOAL BP (BLOOD PRESSURE) < 130/80: ICD-10-CM

## 2023-05-23 LAB
ANION GAP SERPL CALCULATED.3IONS-SCNC: 12 MMOL/L (ref 7–15)
BUN SERPL-MCNC: 12.8 MG/DL (ref 6–20)
CALCIUM SERPL-MCNC: 9.6 MG/DL (ref 8.6–10)
CHLORIDE SERPL-SCNC: 101 MMOL/L (ref 98–107)
CREAT SERPL-MCNC: 0.7 MG/DL (ref 0.51–0.95)
DEPRECATED HCO3 PLAS-SCNC: 26 MMOL/L (ref 22–29)
GFR SERPL CREATININE-BSD FRML MDRD: >90 ML/MIN/1.73M2
GLUCOSE SERPL-MCNC: 96 MG/DL (ref 70–99)
HBA1C MFR BLD: 5.5 % (ref 0–5.6)
POTASSIUM SERPL-SCNC: 4.7 MMOL/L (ref 3.4–5.3)
SODIUM SERPL-SCNC: 139 MMOL/L (ref 136–145)

## 2023-05-23 PROCEDURE — 80048 BASIC METABOLIC PNL TOTAL CA: CPT

## 2023-05-23 PROCEDURE — 36415 COLL VENOUS BLD VENIPUNCTURE: CPT

## 2023-05-23 PROCEDURE — 83036 HEMOGLOBIN GLYCOSYLATED A1C: CPT

## 2023-05-26 ENCOUNTER — VIRTUAL VISIT (OUTPATIENT)
Dept: FAMILY MEDICINE | Facility: CLINIC | Age: 53
End: 2023-05-26
Payer: COMMERCIAL

## 2023-05-26 DIAGNOSIS — E66.01 MORBID OBESITY (H): ICD-10-CM

## 2023-05-26 DIAGNOSIS — R73.03 PREDIABETES: ICD-10-CM

## 2023-05-26 DIAGNOSIS — E28.2 PCOS (POLYCYSTIC OVARIAN SYNDROME): ICD-10-CM

## 2023-05-26 DIAGNOSIS — I10 HYPERTENSION GOAL BP (BLOOD PRESSURE) < 130/80: Primary | ICD-10-CM

## 2023-05-26 PROCEDURE — 99214 OFFICE O/P EST MOD 30 MIN: CPT | Mod: VID | Performed by: NURSE PRACTITIONER

## 2023-05-26 RX ORDER — LISINOPRIL 10 MG/1
10 TABLET ORAL DAILY
Qty: 90 TABLET | Refills: 1 | Status: SHIPPED | OUTPATIENT
Start: 2023-05-26 | End: 2023-11-21

## 2023-05-26 ASSESSMENT — PATIENT HEALTH QUESTIONNAIRE - PHQ9: SUM OF ALL RESPONSES TO PHQ QUESTIONS 1-9: 0

## 2023-05-26 NOTE — PROGRESS NOTES
"Sharon is a 52 year old who is being evaluated via a billable video visit.      How would you like to obtain your AVS? MyChart  If the video visit is dropped, the invitation should be resent by: Text to cell phone: [phone]  Will anyone else be joining your video visit? No          Assessment & Plan     Hypertension goal BP (blood pressure) < 130/80  - lisinopril (ZESTRIL) 10 MG tablet  Dispense: 90 tablet; Refill: 1    Prediabetes  - Semaglutide-Weight Management (WEGOVY) 0.25 MG/0.5ML pen  Dispense: 2 mL; Refill: 0    Morbid obesity (H)  Trial GLP-1 medication. notify us of adverse reaction.  - Semaglutide-Weight Management (WEGOVY) 0.25 MG/0.5ML pen  Dispense: 2 mL; Refill: 0    PCOS (polycystic ovarian syndrome)  - Semaglutide-Weight Management (WEGOVY) 0.25 MG/0.5ML pen  Dispense: 2 mL; Refill: 0      Review of the result(s) of each unique test - lab  Ordering of each unique test  Prescription drug management         BMI:   Estimated body mass index is 44.45 kg/m  as calculated from the following:    Height as of 7/19/22: 1.575 m (5' 2\").    Weight as of 7/19/22: 110.2 kg (243 lb).       Sahara Schaefer, [facility] [place]    Subjective   Sharon is a 52 year old, presenting for the following health issues:  Recheck Medication and Consult (Discuss weight loss options.)        5/26/2023     3:31 PM   Additional Questions   Roomed by Amie Henderson   Accompanied by Self     HPI     Recently changed to lisinopril at last visit on 4/19/23. Has been monitoring home BP. Going well on new medication and no side effects currently. Was having headaches previously but those have resolved.     Previously tried phen-phen years ago and did not lose weight. Did not tolerate metformin. On topomax previously for migraines and did not lose weight.    Recent recheck of labs on 5/23/23 and BMP and a1c normal.    Hypertension Follow-up      Do you check your blood pressure regularly outside of the clinic? Yes     Are " you following a low salt diet? Yes    Are your blood pressures ever more than 140 on the top number (systolic) OR more   than 90 on the bottom number (diastolic), for example 140/90? No      How many servings of fruits and vegetables do you eat daily?  2-3    On average, how many sweetened beverages do you drink each day (Examples: soda, juice, sweet tea, etc.  Do NOT count diet or artificially sweetened beverages)?   0    How many days per week do you exercise enough to make your heart beat faster? 3 or less    How many minutes a day do you exercise enough to make your heart beat faster? 9 or less    How many days per week do you miss taking your medication? 0        Review of Systems   Constitutional, HEENT, cardiovascular, pulmonary, GI, , musculoskeletal, neuro, skin, endocrine and psych systems are negative, except as otherwise noted.      Objective           Vitals:  No vitals were obtained today due to virtual visit.    Physical Exam   GENERAL: Healthy, alert and no distress  EYES: Eyes grossly normal to inspection.  No discharge or erythema, or obvious scleral/conjunctival abnormalities.  RESP: No audible wheeze, cough, or visible cyanosis.  No visible retractions or increased work of breathing.    SKIN: Visible skin clear. No significant rash, abnormal pigmentation or lesions.  NEURO: Cranial nerves grossly intact.  Mentation and speech appropriate for age.  PSYCH: Mentation appears normal, affect normal/bright, judgement and insight intact, normal speech and appearance well-groomed.                Video-Visit Details    Type of service:  Video Visit     Originating Location (pt. Location): Home  Distant Location (provider location):  On-site  Platform used for Video Visit: [employer]

## 2023-06-05 ENCOUNTER — MYC MEDICAL ADVICE (OUTPATIENT)
Dept: FAMILY MEDICINE | Facility: CLINIC | Age: 53
End: 2023-06-05
Payer: COMMERCIAL

## 2023-06-05 ENCOUNTER — TELEPHONE (OUTPATIENT)
Dept: FAMILY MEDICINE | Facility: CLINIC | Age: 53
End: 2023-06-05
Payer: COMMERCIAL

## 2023-06-05 NOTE — TELEPHONE ENCOUNTER
Forms/Letter Request    Type of form/letter: Prior Authorization for Wegovy 0.25MG/0.5ML Auto-injectors    Have you been seen for this request: N/A    Do we have the form/letter: Yes: TC Bin    Who is the form from? [facility] (if other please explain)  Fax: [phone]    Where did/will the form come from? form was faxed in

## 2023-06-05 NOTE — TELEPHONE ENCOUNTER
Prior Authorization Retail Medication Request    Medication/Dose: Prior Authorization for Wegovy 0.25MG/0.5ML Auto-injectors  ICD code (if different than what is on RX):    Previously Tried and Failed:    Rationale:      Insurance Name:    Insurance ID:  [id number]      Pharmacy Information (if different than what is on RX)  Name:  [facility]  Phone:  [phone]

## 2023-06-07 NOTE — TELEPHONE ENCOUNTER
See my chart message     Brooke Ramey RN, BSN  [facility]

## 2023-06-14 ENCOUNTER — MYC MEDICAL ADVICE (OUTPATIENT)
Dept: FAMILY MEDICINE | Facility: CLINIC | Age: 53
End: 2023-06-14
Payer: COMMERCIAL

## 2023-06-14 DIAGNOSIS — E66.01 MORBID OBESITY (H): ICD-10-CM

## 2023-06-14 DIAGNOSIS — R73.03 PREDIABETES: Primary | ICD-10-CM

## 2023-06-14 DIAGNOSIS — E28.2 PCOS (POLYCYSTIC OVARIAN SYNDROME): ICD-10-CM

## 2023-06-15 NOTE — TELEPHONE ENCOUNTER
PRIOR AUTHORIZATION DENIED    Medication: WEGOVY 0.25 MG/0.5ML SC SOAJ  Insurance Company: [employer] Clinical Review - Phone [phone] Fax [phone]  Denial Date: 6/15/2023  Denial Rational: Weight loss medications are a plan exclusion        Appeal Information: N/A-Plan exclusion  Patient Notified: No

## 2023-06-15 NOTE — TELEPHONE ENCOUNTER
Central Prior Authorization Team   Phone: [phone]      PA Initiation    Medication: WEGOVY 0.25 MG/0.5ML SC SOAJ  Insurance Company: [employer] Clinical Review - Phone [phone] Fax [phone]  Pharmacy Filling the Rx: [id number] - [place], [place] - [address]  Filling Pharmacy Phone:    Filling Pharmacy Fax:    Start Date: 6/15/2023

## 2023-06-16 NOTE — TELEPHONE ENCOUNTER
Please review Convene message     Per 5/26/23 virtual visit      Previously tried phen-phen years ago and did not lose weight. Did not tolerate metformin. On topomax previously for migraines and did not lose weight.     Lorena BHATIT RN   [facility]

## 2023-06-16 NOTE — TELEPHONE ENCOUNTER
Prior Authorization Retail Medication Request    Medication/Dose: Wegovy 0.25MG/0.5ML Auto-injectors-PLAN EXCLUSION  ICD code (if different than what is on RX):    Previously Tried and Failed:    Rationale:      Insurance Name:    Insurance ID:  [id number]      Pharmacy Information (if different than what is on RX)  Name:  [facility]  Phone:  [phone]

## 2023-06-16 NOTE — TELEPHONE ENCOUNTER
Please review with A Olive BARDALES    Very difficult to get wegovy    Most insurances will not cover    Consider [facility]

## 2023-06-16 NOTE — TELEPHONE ENCOUNTER
Medication is a plan exclusion and insurance will not cover, a PA cannot be completed for the medication.

## 2023-08-27 ENCOUNTER — HEALTH MAINTENANCE LETTER (OUTPATIENT)
Age: 53
End: 2023-08-27

## 2023-11-05 ENCOUNTER — HEALTH MAINTENANCE LETTER (OUTPATIENT)
Age: 53
End: 2023-11-05

## 2023-11-21 DIAGNOSIS — I10 HYPERTENSION GOAL BP (BLOOD PRESSURE) < 130/80: ICD-10-CM

## 2023-11-21 RX ORDER — LISINOPRIL 10 MG/1
10 TABLET ORAL DAILY
Qty: 90 TABLET | Refills: 0 | Status: SHIPPED | OUTPATIENT
Start: 2023-11-21 | End: 2024-03-19

## 2023-11-22 RX ORDER — LISINOPRIL 10 MG/1
10 TABLET ORAL DAILY
Qty: 90 TABLET | Refills: 0 | OUTPATIENT
Start: 2023-11-22

## 2024-03-15 ENCOUNTER — TELEPHONE (OUTPATIENT)
Dept: FAMILY MEDICINE | Facility: CLINIC | Age: 54
End: 2024-03-15
Payer: COMMERCIAL

## 2024-03-15 DIAGNOSIS — I10 HYPERTENSION GOAL BP (BLOOD PRESSURE) < 130/80: ICD-10-CM

## 2024-03-18 NOTE — TELEPHONE ENCOUNTER
Med did not fail BP was last completed in 2022    Please advise     Thank you     Brooke Ramey RN, BSN  [facility]

## 2024-03-19 ENCOUNTER — MYC REFILL (OUTPATIENT)
Dept: FAMILY MEDICINE | Facility: CLINIC | Age: 54
End: 2024-03-19
Payer: COMMERCIAL

## 2024-03-19 DIAGNOSIS — I10 HYPERTENSION GOAL BP (BLOOD PRESSURE) < 130/80: ICD-10-CM

## 2024-03-19 RX ORDER — LISINOPRIL 10 MG/1
10 TABLET ORAL DAILY
Qty: 90 TABLET | Refills: 0 | Status: SHIPPED | OUTPATIENT
Start: 2024-03-19 | End: 2024-05-16

## 2024-03-19 RX ORDER — LISINOPRIL 10 MG/1
10 TABLET ORAL DAILY
Qty: 90 TABLET | Refills: 0 | OUTPATIENT
Start: 2024-03-19

## 2024-03-19 NOTE — TELEPHONE ENCOUNTER
Due for visit. Will fill 90 day supply to allow time for scheduling. Should be in person so we can check BP.    Sahara Schaefer, CNP

## 2024-03-20 ENCOUNTER — MYC MEDICAL ADVICE (OUTPATIENT)
Dept: FAMILY MEDICINE | Facility: CLINIC | Age: 54
End: 2024-03-20
Payer: COMMERCIAL

## 2024-05-16 ENCOUNTER — VIRTUAL VISIT (OUTPATIENT)
Dept: FAMILY MEDICINE | Facility: CLINIC | Age: 54
End: 2024-05-16
Payer: COMMERCIAL

## 2024-05-16 DIAGNOSIS — E66.01 MORBID OBESITY (H): ICD-10-CM

## 2024-05-16 DIAGNOSIS — E28.2 PCOS (POLYCYSTIC OVARIAN SYNDROME): ICD-10-CM

## 2024-05-16 DIAGNOSIS — F41.9 ANXIETY: Primary | ICD-10-CM

## 2024-05-16 DIAGNOSIS — I10 HYPERTENSION GOAL BP (BLOOD PRESSURE) < 130/80: ICD-10-CM

## 2024-05-16 DIAGNOSIS — R73.03 PREDIABETES: ICD-10-CM

## 2024-05-16 PROCEDURE — 99213 OFFICE O/P EST LOW 20 MIN: CPT | Mod: 95 | Performed by: NURSE PRACTITIONER

## 2024-05-16 RX ORDER — BUPROPION HYDROCHLORIDE 300 MG/1
300 TABLET ORAL EVERY MORNING
COMMUNITY
End: 2024-05-16

## 2024-05-16 RX ORDER — BUPROPION HYDROCHLORIDE 300 MG/1
300 TABLET ORAL EVERY MORNING
Qty: 90 TABLET | Refills: 3 | Status: SHIPPED | OUTPATIENT
Start: 2024-05-16

## 2024-05-16 ASSESSMENT — PATIENT HEALTH QUESTIONNAIRE - PHQ9
SUM OF ALL RESPONSES TO PHQ QUESTIONS 1-9: 0
SUM OF ALL RESPONSES TO PHQ QUESTIONS 1-9: 0

## 2024-05-16 ASSESSMENT — ANXIETY QUESTIONNAIRES
GAD7 TOTAL SCORE: 0
7. FEELING AFRAID AS IF SOMETHING AWFUL MIGHT HAPPEN: NOT AT ALL
GAD7 TOTAL SCORE: 0
3. WORRYING TOO MUCH ABOUT DIFFERENT THINGS: NOT AT ALL
5. BEING SO RESTLESS THAT IT IS HARD TO SIT STILL: NOT AT ALL
1. FEELING NERVOUS, ANXIOUS, OR ON EDGE: NOT AT ALL
6. BECOMING EASILY ANNOYED OR IRRITABLE: NOT AT ALL
7. FEELING AFRAID AS IF SOMETHING AWFUL MIGHT HAPPEN: NOT AT ALL
4. TROUBLE RELAXING: NOT AT ALL
GAD7 TOTAL SCORE: 0
2. NOT BEING ABLE TO STOP OR CONTROL WORRYING: NOT AT ALL

## 2024-05-16 NOTE — PROGRESS NOTES
Sharon is a 53 year old who is being evaluated via a billable video visit.    How would you like to obtain your AVS? MyChart  If the video visit is dropped, the invitation should be resent by: Text to cell phone: [phone]  Will anyone else be joining your video visit? No      Assessment & Plan     Hypertension goal BP (blood pressure) < 130/80  - lisinopril (ZESTRIL) 10 MG tablet  Dispense: 90 tablet; Refill: 3    Prediabetes    Morbid obesity (H)  Continue to work on weight loss which will contribute to improved management of HTN, Prediabetes      PCOS (polycystic ovarian syndrome)      Anxiety  - buPROPion (WELLBUTRIN XL) 300 MG 24 hr tablet  Dispense: 90 tablet; Refill: 3                Subjective   Sharon is a 53 year old, presenting for the following health issues:  Recheck Medication        5/16/2024     4:07 PM   Additional Questions   Roomed by My TILLEY     History of Present Illness       Reason for visit:  Medication follow up    She eats 2-3 servings of fruits and vegetables daily.She consumes 0 sweetened beverage(s) daily.She exercises with enough effort to increase her heart rate 9 or less minutes per day.  She exercises with enough effort to increase her heart rate 3 or less days per week.   She is taking medications regularly.       Depression and Anxiety   How are you doing with your depression since your last visit? Improved   How are you doing with your anxiety since your last visit?  Improved   Are you having other symptoms that might be associated with depression or anxiety? No  Have you had a significant life event? No   Do you have any concerns with your use of alcohol or other drugs? No      Social History     Tobacco Use    Smoking status: Never    Smokeless tobacco: Never   Vaping Use    Vaping status: Never Used   Substance Use Topics    Alcohol use: Not Currently     Alcohol/week: 0.0 - 1.0 standard drinks of alcohol     Comment: rare    Drug use: No         4/19/2023     9:44 AM 5/26/2023      4:07 PM 5/16/2024     4:12 PM   PHQ   PHQ-9 Total Score 6 0 0   Q9: Thoughts of better off dead/self-harm past 2 weeks Not at all Not at all Not at all         12/14/2020    10:11 PM 7/19/2022     9:27 AM 5/16/2024     4:13 PM   ANDREW-7 SCORE   Total Score 0 (minimal anxiety)  0 (minimal anxiety)   Total Score 0 0 0         5/16/2024     4:12 PM   Last PHQ-9   1.  Little interest or pleasure in doing things 0   2.  Feeling down, depressed, or hopeless 0   3.  Trouble falling or staying asleep, or sleeping too much 0   4.  Feeling tired or having little energy 0   5.  Poor appetite or overeating 0   6.  Feeling bad about yourself 0   7.  Trouble concentrating 0   8.  Moving slowly or restless 0   Q9: Thoughts of better off dead/self-harm past 2 weeks 0   PHQ-9 Total Score 0         5/16/2024     4:13 PM   ANDREW-7    1. Feeling nervous, anxious, or on edge 0   2. Not being able to stop or control worrying 0   3. Worrying too much about different things 0   4. Trouble relaxing 0   5. Being so restless that it is hard to sit still 0   6. Becoming easily annoyed or irritable 0   7. Feeling afraid, as if something awful might happen 0   ANDREW-7 Total Score 0       Suicide Assessment Five-step Evaluation and Treatment (SAFE-T)          Constitutional, HEENT, cardiovascular, pulmonary, GI, , musculoskeletal, neuro, skin, endocrine and psych systems are negative, except as otherwise noted.        Objective           Vitals:  No vitals were obtained today due to virtual visit.    Physical Exam   GENERAL: alert and no distress  EYES: Eyes grossly normal to inspection.  No discharge or erythema, or obvious scleral/conjunctival abnormalities.  RESP: No audible wheeze, cough, or visible cyanosis.    SKIN: Visible skin clear. No significant rash, abnormal pigmentation or lesions.  NEURO: Cranial nerves grossly intact.  Mentation and speech appropriate for age.  PSYCH: Appropriate affect, tone, and pace of words           Video-Visit Details    Type of service:  Video Visit   Originating Location (pt. Location): Home    Distant Location (provider location):  On-site  Platform used for Video Visit: [employer]  Signed Electronically by: Sahara Schaefer CNP

## 2024-05-30 RX ORDER — LISINOPRIL 10 MG/1
10 TABLET ORAL DAILY
Qty: 90 TABLET | Refills: 3 | Status: SHIPPED | OUTPATIENT
Start: 2024-05-30 | End: 2024-07-25

## 2024-06-03 ENCOUNTER — MYC MEDICAL ADVICE (OUTPATIENT)
Dept: FAMILY MEDICINE | Facility: CLINIC | Age: 54
End: 2024-06-03
Payer: COMMERCIAL

## 2024-07-25 ENCOUNTER — OFFICE VISIT (OUTPATIENT)
Dept: FAMILY MEDICINE | Facility: CLINIC | Age: 54
End: 2024-07-25
Payer: COMMERCIAL

## 2024-07-25 ENCOUNTER — MYC MEDICAL ADVICE (OUTPATIENT)
Dept: FAMILY MEDICINE | Facility: CLINIC | Age: 54
End: 2024-07-25

## 2024-07-25 ENCOUNTER — ORDERS ONLY (AUTO-RELEASED) (OUTPATIENT)
Dept: FAMILY MEDICINE | Facility: CLINIC | Age: 54
End: 2024-07-25

## 2024-07-25 VITALS
DIASTOLIC BLOOD PRESSURE: 98 MMHG | HEIGHT: 62 IN | RESPIRATION RATE: 16 BRPM | OXYGEN SATURATION: 97 % | SYSTOLIC BLOOD PRESSURE: 140 MMHG | HEART RATE: 98 BPM | WEIGHT: 218.1 LBS | BODY MASS INDEX: 40.14 KG/M2 | TEMPERATURE: 97.8 F

## 2024-07-25 DIAGNOSIS — R73.03 PREDIABETES: ICD-10-CM

## 2024-07-25 DIAGNOSIS — Z00.00 ROUTINE PHYSICAL EXAMINATION: Primary | ICD-10-CM

## 2024-07-25 DIAGNOSIS — Z12.31 ENCOUNTER FOR SCREENING MAMMOGRAM FOR MALIGNANT NEOPLASM OF BREAST: ICD-10-CM

## 2024-07-25 DIAGNOSIS — Z12.11 SPECIAL SCREENING FOR MALIGNANT NEOPLASMS, COLON: ICD-10-CM

## 2024-07-25 DIAGNOSIS — E66.01 MORBID OBESITY (H): ICD-10-CM

## 2024-07-25 DIAGNOSIS — E28.2 PCOS (POLYCYSTIC OVARIAN SYNDROME): ICD-10-CM

## 2024-07-25 DIAGNOSIS — Z13.21 ENCOUNTER FOR VITAMIN DEFICIENCY SCREENING: ICD-10-CM

## 2024-07-25 DIAGNOSIS — I10 HYPERTENSION GOAL BP (BLOOD PRESSURE) < 130/80: ICD-10-CM

## 2024-07-25 DIAGNOSIS — Z13.29 THYROID DISORDER SCREENING: ICD-10-CM

## 2024-07-25 DIAGNOSIS — Z13.220 SCREENING CHOLESTEROL LEVEL: ICD-10-CM

## 2024-07-25 DIAGNOSIS — Z13.0 SCREENING FOR DEFICIENCY ANEMIA: ICD-10-CM

## 2024-07-25 LAB
ERYTHROCYTE [DISTWIDTH] IN BLOOD BY AUTOMATED COUNT: 12.9 % (ref 10–15)
HBA1C MFR BLD: 5.4 % (ref 0–5.6)
HCT VFR BLD AUTO: 38.2 % (ref 35–47)
HGB BLD-MCNC: 12.8 G/DL (ref 11.7–15.7)
MCH RBC QN AUTO: 29.6 PG (ref 26.5–33)
MCHC RBC AUTO-ENTMCNC: 33.5 G/DL (ref 31.5–36.5)
MCV RBC AUTO: 88 FL (ref 78–100)
PLATELET # BLD AUTO: 285 10E3/UL (ref 150–450)
RBC # BLD AUTO: 4.33 10E6/UL (ref 3.8–5.2)
WBC # BLD AUTO: 7.8 10E3/UL (ref 4–11)

## 2024-07-25 PROCEDURE — 36415 COLL VENOUS BLD VENIPUNCTURE: CPT | Performed by: NURSE PRACTITIONER

## 2024-07-25 PROCEDURE — 85027 COMPLETE CBC AUTOMATED: CPT | Performed by: NURSE PRACTITIONER

## 2024-07-25 PROCEDURE — 80061 LIPID PANEL: CPT | Performed by: NURSE PRACTITIONER

## 2024-07-25 PROCEDURE — 84443 ASSAY THYROID STIM HORMONE: CPT | Performed by: NURSE PRACTITIONER

## 2024-07-25 PROCEDURE — 82306 VITAMIN D 25 HYDROXY: CPT | Performed by: NURSE PRACTITIONER

## 2024-07-25 PROCEDURE — 80053 COMPREHEN METABOLIC PANEL: CPT | Performed by: NURSE PRACTITIONER

## 2024-07-25 PROCEDURE — 99213 OFFICE O/P EST LOW 20 MIN: CPT | Mod: 25 | Performed by: NURSE PRACTITIONER

## 2024-07-25 PROCEDURE — 83036 HEMOGLOBIN GLYCOSYLATED A1C: CPT | Performed by: NURSE PRACTITIONER

## 2024-07-25 PROCEDURE — 99396 PREV VISIT EST AGE 40-64: CPT | Performed by: NURSE PRACTITIONER

## 2024-07-25 RX ORDER — LOSARTAN POTASSIUM 25 MG/1
25 TABLET ORAL DAILY
Qty: 90 TABLET | Refills: 3 | Status: SHIPPED | OUTPATIENT
Start: 2024-07-25 | End: 2024-09-26

## 2024-07-25 SDOH — HEALTH STABILITY: PHYSICAL HEALTH: ON AVERAGE, HOW MANY DAYS PER WEEK DO YOU ENGAGE IN MODERATE TO STRENUOUS EXERCISE (LIKE A BRISK WALK)?: 2 DAYS

## 2024-07-25 SDOH — HEALTH STABILITY: PHYSICAL HEALTH: ON AVERAGE, HOW MANY MINUTES DO YOU ENGAGE IN EXERCISE AT THIS LEVEL?: 30 MIN

## 2024-07-25 ASSESSMENT — ANXIETY QUESTIONNAIRES
GAD7 TOTAL SCORE: 0
4. TROUBLE RELAXING: NOT AT ALL
7. FEELING AFRAID AS IF SOMETHING AWFUL MIGHT HAPPEN: NOT AT ALL
GAD7 TOTAL SCORE: 0
GAD7 TOTAL SCORE: 0
7. FEELING AFRAID AS IF SOMETHING AWFUL MIGHT HAPPEN: NOT AT ALL
2. NOT BEING ABLE TO STOP OR CONTROL WORRYING: NOT AT ALL
3. WORRYING TOO MUCH ABOUT DIFFERENT THINGS: NOT AT ALL
1. FEELING NERVOUS, ANXIOUS, OR ON EDGE: NOT AT ALL
6. BECOMING EASILY ANNOYED OR IRRITABLE: NOT AT ALL
5. BEING SO RESTLESS THAT IT IS HARD TO SIT STILL: NOT AT ALL

## 2024-07-25 ASSESSMENT — SOCIAL DETERMINANTS OF HEALTH (SDOH): HOW OFTEN DO YOU GET TOGETHER WITH FRIENDS OR RELATIVES?: ONCE A WEEK

## 2024-07-25 NOTE — TELEPHONE ENCOUNTER
See my chart can we use approval only today I did talk with pt and she can make that goldie Mcbride RN

## 2024-07-25 NOTE — PROGRESS NOTES
"Preventive Care Visit  [facility] [place]  Sahara Schaefer CNP, Nurse Practitioner - Family  Jul 25, 2024      Assessment & Plan     Routine physical examination    Prediabetes  - Comprehensive metabolic panel  - Hemoglobin A1c  - Comprehensive metabolic panel  - Hemoglobin A1c    Hypertension goal BP (blood pressure) < 130/80  - Comprehensive metabolic panel  - losartan (COZAAR) 25 MG tablet  Dispense: 90 tablet; Refill: 3  - Comprehensive metabolic panel    Morbid obesity (H)  Consider weight loss medication. Will see if covered with her insurance once labs return.    PCOS (polycystic ovarian syndrome)    Screening cholesterol level  - Lipid panel reflex to direct LDL Non-fasting  - Lipid panel reflex to direct LDL Non-fasting    Thyroid disorder screening  - TSH with free T4 reflex  - TSH with free T4 reflex    Encounter for vitamin deficiency screening  - Vitamin D Deficiency  - Vitamin D Deficiency    Screening for deficiency anemia  - CBC with platelets  - CBC with platelets    Encounter for screening mammogram for malignant neoplasm of breast  - MA Screening Bilateral w/ Omar    Special screening for malignant neoplasms, colon  - COLOGUARD([employer])      Patient has been advised of split billing requirements and indicates understanding: Yes        BMI  Estimated body mass index is 40.21 kg/m  as calculated from the following:    Height as of this encounter: 1.568 m (5' 1.75\").    Weight as of this encounter: 98.9 kg (218 lb 1.6 oz).   Weight management plan: Discussed healthy diet and exercise guidelines    Counseling  Appropriate preventive services were addressed with this patient via screening, questionnaire, or discussion as appropriate for fall prevention, nutrition, physical activity, Tobacco-use cessation, weight loss and cognition.  Checklist reviewing preventive services available has been given to the patient.      See Patient Instructions    Juana Herrera is a 53 year old, " presenting for the following:  Physical (Not fasting )        5/16/2024     4:07 PM   Additional Questions   Roomed by [facility]        Health Care Directive  Patient does not have a Health Care Directive or Living Will: Patient states has Advance Directive and will bring in a copy to clinic.    Healthy Habits:     Taking medications regularly:  0  History of Present Illness       Mental Health Follow-up:  Patient presents to follow-up on Depression & Anxiety.Patient's depression since last visit has been:  Better  The patient is having other symptoms associated with depression.  Patient's anxiety since last visit has been:  Better  The patient is not having other symptoms associated with anxiety.  Any significant life events: grief or loss and other  Patient is not feeling anxious or having panic attacks.  Patient has no concerns about alcohol or drug use.    Hypertension: She presents for follow up of hypertension.  She does check blood pressure  regularly outside of the clinic. Outside blood pressures have been over 140/90. She follows a low salt diet.     She eats 2-3 servings of fruits and vegetables daily.She consumes 2 sweetened beverage(s) daily. She exercises with enough effort to increase her heart rate 3 or less days per week.   She is taking medications regularly.    Would like to discuss weight medication today. Trouble losing although working hard at diet and exercise.     Hypertension Follow-up    Do you check your blood pressure regularly outside of the clinic? Yes   Are you following a low salt diet? Yes  Are your blood pressures ever more than 140 on the top number (systolic) OR more   than 90 on the bottom number (diastolic), for example 140/90? Yes    Anxiety   How are you doing with your anxiety since your last visit? Stable   Are you having other symptoms that might be associated with anxiety? Yes:  possibly due to  illness  Have you had a significant life event? Health  Concerns   Are you feeling depressed? No  Do you have any concerns with your use of alcohol or other drugs? No    Social History     Tobacco Use    Smoking status: Never    Smokeless tobacco: Never   Vaping Use    Vaping status: Never Used   Substance Use Topics    Alcohol use: Not Currently     Alcohol/week: 0.0 - 1.0 standard drinks of alcohol     Comment: rare    Drug use: No         7/19/2022     9:27 AM 5/16/2024     4:13 PM 7/25/2024     2:36 PM   ANDREW-7 SCORE   Total Score  0 (minimal anxiety) 0 (minimal anxiety)   Total Score 0 0 0         4/19/2023     9:44 AM 5/26/2023     4:07 PM 5/16/2024     4:12 PM   PHQ   PHQ-9 Total Score 6 0 0   Q9: Thoughts of better off dead/self-harm past 2 weeks Not at all Not at all Not at all         5/16/2024     4:12 PM   Last PHQ-9   1.  Little interest or pleasure in doing things 0   2.  Feeling down, depressed, or hopeless 0   3.  Trouble falling or staying asleep, or sleeping too much 0   4.  Feeling tired or having little energy 0   5.  Poor appetite or overeating 0   6.  Feeling bad about yourself 0   7.  Trouble concentrating 0   8.  Moving slowly or restless 0   Q9: Thoughts of better off dead/self-harm past 2 weeks 0   PHQ-9 Total Score 0         7/25/2024     2:36 PM   ANDREW-7    1. Feeling nervous, anxious, or on edge 0   2. Not being able to stop or control worrying 0   3. Worrying too much about different things 0   4. Trouble relaxing 0   5. Being so restless that it is hard to sit still 0   6. Becoming easily annoyed or irritable 0   7. Feeling afraid, as if something awful might happen 0   ANDREW-7 Total Score 0           7/25/2024   General Health   How would you rate your overall physical health? (!) FAIR   Feel stress (tense, anxious, or unable to sleep) Not at all            7/25/2024   Nutrition   Three or more servings of calcium each day? Yes   Diet: Regular (no restrictions)   How many servings of fruit and vegetables per day? (!) 2-3   How many  sweetened beverages each day? 0-1            7/25/2024   Exercise   Days per week of moderate/strenous exercise 2 days   Average minutes spent exercising at this level 30 min      (!) EXERCISE CONCERN      7/25/2024   Social Factors   Frequency of gathering with friends or relatives Once a week   Worry food won't last until get money to buy more No   Food not last or not have enough money for food? No   Do you have housing? (Housing is defined as stable permanent housing and does not include staying ouside in a car, in a tent, in an abandoned building, in an overnight shelter, or couch-surfing.) Yes   Are you worried about losing your housing? No   Lack of transportation? No   Unable to get utilities (heat,electricity)? No            7/25/2024   Fall Risk   Fallen 2 or more times in the past year? No   Trouble with walking or balance? No             7/25/2024   Dental   Dentist two times every year? Yes            7/25/2024   TB Screening   Were you born outside of the [place]? Yes            Today's PHQ-9 Score:       5/16/2024     4:12 PM   PHQ-9 SCORE   PHQ-9 Total Score MyChart 0   PHQ-9 Total Score 0           7/25/2024   Substance Use   Alcohol more than 3/day or more than 7/wk No   Do you use any other substances recreationally? No        Social History     Tobacco Use    Smoking status: Never    Smokeless tobacco: Never   Vaping Use    Vaping status: Never Used   Substance Use Topics    Alcohol use: Not Currently     Alcohol/week: 0.0 - 1.0 standard drinks of alcohol     Comment: rare    Drug use: No           8/4/2022   LAST FHS-7 RESULTS   1st degree relative breast or ovarian cancer Yes    Yes   Any relative bilateral breast cancer No   Any male have breast cancer No   Any ONE woman have BOTH breast AND ovarian cancer No   Any woman with breast cancer before 50yrs No   2 or more relatives with breast AND/OR ovarian cancer No   2 or more relatives with breast AND/OR bowel cancer No       Multiple values from  one day are sorted in reverse-chronological order        Mammogram Screening - Mammogram every 1-2 years updated in Health Maintenance based on mutual decision making        7/25/2024   STI Screening   New sexual partner(s) since last STI/HIV test? No        History of abnormal Pap smear: No - age 30-64 HPV with reflex Pap every 5 years recommended        Latest Ref Rng & Units 10/12/2020     8:39 AM 10/12/2020     8:33 AM 12/22/2015     2:43 PM   PAP / HPV   PAP (Historical)  NIL      HPV 16 DNA NEG^Negative  Negative  Negative    HPV 18 DNA NEG^Negative  Negative  Negative    Other HR HPV NEG^Negative  Negative  Negative      ASCVD Risk   The 10-year ASCVD risk score (Nubia MAGANA, et al., 2019) is: 3.5%    Values used to calculate the score:      Age: 53 years      Sex: Female      Is Non- : No      Diabetic: No      Tobacco smoker: No      Systolic Blood Pressure: 138 mmHg      Is BP treated: Yes      HDL Cholesterol: 48 mg/dL      Total Cholesterol: 217 mg/dL           Reviewed and updated as needed this visit by Provider                    Past Medical History:   Diagnosis Date    ADD (attention deficit disorder)     Anxiety 12/15/2020    Asthma     allergy related    Excessive menstruation     resolved with hysterectomy/bso 10/08     GERD (gastroesophageal reflux disease)     Hypertension goal BP (blood pressure) < 140/90     Lyme disease 2005    Major depressive disorder, single episode, mild (H24) 05/2009    Obesity, unspecified     PCOS (polycystic ovarian syndrome) 2004    dr aragon/jameson - watching DM     Past Surgical History:   Procedure Laterality Date    CYSTECTOMY PILONIDAL  1984    HYSTERECTOMY, SUPRACERVICAL LAPAROSCOPIC  10/2008    supracevical hysterectomy/BSO - Dr Martini    LAPAROSCOPIC CHOLECYSTECTOMY  10/18/2011    Dr Young    LAPAROSCOPIC TUBAL LIGATION  1991    TONSILLECTOMY  1976         Review of Systems  Constitutional, HEENT, cardiovascular, pulmonary,  "GI, , musculoskeletal, neuro, skin, endocrine and psych systems are negative, except as otherwise noted.     Objective    Exam  BP (!) 138/100   Pulse 98   Temp 97.8  F (36.6  C) (Oral)   Resp 16   Ht 1.568 m (5' 1.75\")   Wt 98.9 kg (218 lb 1.6 oz)   LMP 03/09/2007   SpO2 97%   BMI 40.21 kg/m     Estimated body mass index is 40.21 kg/m  as calculated from the following:    Height as of this encounter: 1.568 m (5' 1.75\").    Weight as of this encounter: 98.9 kg (218 lb 1.6 oz).    Physical Exam  GENERAL: alert and no distress  EYES: Eyes grossly normal to inspection, PERRL and conjunctivae and sclerae normal  HENT: ear canals and TM's normal, nose and mouth without ulcers or lesions  NECK: no adenopathy, no asymmetry, masses, or scars  RESP: lungs clear to auscultation - no rales, rhonchi or wheezes  CV: regular rate and rhythm, normal S1 S2, no S3 or S4, no murmur, click or rub, no peripheral edema  ABDOMEN: soft, nontender, no hepatosplenomegaly, no masses and bowel sounds normal  MS: no gross musculoskeletal defects noted, no edema  SKIN: no suspicious lesions or rashes  NEURO: Normal strength and tone, mentation intact and speech normal  PSYCH: mentation appears normal, affect normal/bright        Signed Electronically by: Sahara Schaefer CNP    "

## 2024-07-25 NOTE — PATIENT INSTRUCTIONS
Cost of compounded medications  0.25mg ~$222  0.5mg ~$260  1mg ~$306  1.5mg ~$342  2mg ~$395  2.5mg ~$438

## 2024-07-25 NOTE — TELEPHONE ENCOUNTER
Message handled by Nurse Triage with [employer] - provider name: Sahara Schaefer CNP - ok to use [facility] - can do virtual or in person .    Called and spoke with patient.      Scheduled appt    Next 5 appointments (look out 90 days)      Jul 25, 2024 4:00 PM  (Arrive by 3:40 PM)  Provider Visit with Sahara Schaefer CNP  [facility] ([facility] ) [address] [place] [id number] [phone]             DEENA NDIAYE RN on 7/25/2024 at 12:38 PM   [facility]

## 2024-07-26 LAB
ALBUMIN SERPL BCG-MCNC: 4.4 G/DL (ref 3.5–5.2)
ALP SERPL-CCNC: 79 U/L (ref 40–150)
ALT SERPL W P-5'-P-CCNC: 15 U/L (ref 0–50)
ANION GAP SERPL CALCULATED.3IONS-SCNC: 12 MMOL/L (ref 7–15)
AST SERPL W P-5'-P-CCNC: 13 U/L (ref 0–45)
BILIRUB SERPL-MCNC: 0.2 MG/DL
BUN SERPL-MCNC: 14 MG/DL (ref 6–20)
CALCIUM SERPL-MCNC: 9.6 MG/DL (ref 8.8–10.4)
CHLORIDE SERPL-SCNC: 102 MMOL/L (ref 98–107)
CHOLEST SERPL-MCNC: 247 MG/DL
CREAT SERPL-MCNC: 0.8 MG/DL (ref 0.51–0.95)
EGFRCR SERPLBLD CKD-EPI 2021: 87 ML/MIN/1.73M2
FASTING STATUS PATIENT QL REPORTED: NO
FASTING STATUS PATIENT QL REPORTED: NO
GLUCOSE SERPL-MCNC: 94 MG/DL (ref 70–99)
HCO3 SERPL-SCNC: 27 MMOL/L (ref 22–29)
HDLC SERPL-MCNC: 55 MG/DL
LDLC SERPL CALC-MCNC: 156 MG/DL
NONHDLC SERPL-MCNC: 192 MG/DL
POTASSIUM SERPL-SCNC: 4.1 MMOL/L (ref 3.4–5.3)
PROT SERPL-MCNC: 7.7 G/DL (ref 6.4–8.3)
SODIUM SERPL-SCNC: 141 MMOL/L (ref 135–145)
TRIGL SERPL-MCNC: 181 MG/DL
TSH SERPL DL<=0.005 MIU/L-ACNC: 1.42 UIU/ML (ref 0.3–4.2)
VIT D+METAB SERPL-MCNC: 31 NG/ML (ref 20–50)

## 2024-08-01 ENCOUNTER — MYC MEDICAL ADVICE (OUTPATIENT)
Dept: FAMILY MEDICINE | Facility: CLINIC | Age: 54
End: 2024-08-01
Payer: COMMERCIAL

## 2024-08-01 DIAGNOSIS — E66.01 MORBID OBESITY (H): Primary | ICD-10-CM

## 2024-08-01 DIAGNOSIS — E78.5 HYPERLIPIDEMIA LDL GOAL <130: ICD-10-CM

## 2024-08-01 DIAGNOSIS — I10 HYPERTENSION GOAL BP (BLOOD PRESSURE) < 130/80: ICD-10-CM

## 2024-08-01 DIAGNOSIS — R73.03 PREDIABETES: ICD-10-CM

## 2024-08-01 NOTE — TELEPHONE ENCOUNTER
See mychart with update on BP and request for compounded weigh loss medication.     Ruma Mcbride RN

## 2024-08-02 ENCOUNTER — TELEPHONE (OUTPATIENT)
Dept: FAMILY MEDICINE | Facility: CLINIC | Age: 54
End: 2024-08-02
Payer: COMMERCIAL

## 2024-08-02 NOTE — TELEPHONE ENCOUNTER
PRIOR AUTHORIZATION DENIED    Medication: SEMAGLUTIDE(0.25 OR 0.5MG/DOS) 2 MG/3ML SC SOPN  Insurance Company: [employer] - Phone [phone] Fax [phone]  Denial Date: 8/2/2024  Denial Reason(s):     Appeal Information:     Patient Notified: No

## 2024-08-05 ENCOUNTER — MYC MEDICAL ADVICE (OUTPATIENT)
Dept: FAMILY MEDICINE | Facility: CLINIC | Age: 54
End: 2024-08-05
Payer: COMMERCIAL

## 2024-08-05 DIAGNOSIS — E66.01 MORBID OBESITY (H): Primary | ICD-10-CM

## 2024-08-05 DIAGNOSIS — R73.03 PREDIABETES: ICD-10-CM

## 2024-08-05 DIAGNOSIS — I10 HYPERTENSION GOAL BP (BLOOD PRESSURE) < 130/80: ICD-10-CM

## 2024-08-05 DIAGNOSIS — E78.5 HYPERLIPIDEMIA LDL GOAL <130: ICD-10-CM

## 2024-08-13 NOTE — TELEPHONE ENCOUNTER
See my chart follow up-   with request to resume lisinopril?  Send to pended pharmacy       Ruma Mcbride RN

## 2024-09-25 ENCOUNTER — MYC MEDICAL ADVICE (OUTPATIENT)
Dept: FAMILY MEDICINE | Facility: CLINIC | Age: 54
End: 2024-09-25
Payer: COMMERCIAL

## 2024-09-25 DIAGNOSIS — I10 HYPERTENSION GOAL BP (BLOOD PRESSURE) < 130/80: Primary | ICD-10-CM

## 2024-09-25 NOTE — TELEPHONE ENCOUNTER
Reviewed chart     7/25/24 office visit   Lisinopril was discontinued 7/25/24  Losartan 25mg daily was ordered 7/25/24    losartan (COZAAR) 25 MG tablet 90 tablet 3 7/25/2024 -- No   Sig - Route: Take 1 tablet (25 mg) by mouth daily - Oral   Pharmacy    [id number] - [place], [place] - [address] [address]       See 8/5/24- [employer] messages - changed back to 20mg of lisinopril.     [employer] sent to patient to ask about blood pressure readings and if she is having any symptoms     Losartan should be discontinued in epic      Routing request to Sahara Schaefer

## 2024-09-26 RX ORDER — LISINOPRIL 20 MG/1
20 TABLET ORAL DAILY
Qty: 90 TABLET | Refills: 3 | Status: SHIPPED | OUTPATIENT
Start: 2024-09-26

## 2024-10-20 ENCOUNTER — HEALTH MAINTENANCE LETTER (OUTPATIENT)
Age: 54
End: 2024-10-20

## 2024-12-16 ENCOUNTER — MYC MEDICAL ADVICE (OUTPATIENT)
Dept: FAMILY MEDICINE | Facility: CLINIC | Age: 54
End: 2024-12-16
Payer: COMMERCIAL

## 2024-12-18 ENCOUNTER — PATIENT OUTREACH (OUTPATIENT)
Dept: CARE COORDINATION | Facility: CLINIC | Age: 54
End: 2024-12-18
Payer: COMMERCIAL

## 2024-12-22 ENCOUNTER — HEALTH MAINTENANCE LETTER (OUTPATIENT)
Age: 54
End: 2024-12-22

## 2025-06-11 ENCOUNTER — MYC REFILL (OUTPATIENT)
Dept: FAMILY MEDICINE | Facility: CLINIC | Age: 55
End: 2025-06-11
Payer: COMMERCIAL

## 2025-06-11 DIAGNOSIS — F41.9 ANXIETY: ICD-10-CM

## 2025-06-11 RX ORDER — BUPROPION HYDROCHLORIDE 300 MG/1
300 TABLET ORAL EVERY MORNING
Qty: 90 TABLET | Refills: 3 | Status: CANCELLED | OUTPATIENT
Start: 2025-06-11

## 2025-06-11 NOTE — TELEPHONE ENCOUNTER
Clinic RN: Please investigate patient's chart or contact patient if the information cannot be found because [employer] pharmacies have experienced unauthorized activity and have proactively disabled their systems at some pharmacies.Contact the patient to get an alternate pharmacy. Update the pharmacy and send refill encounter back to the corresponding Refill pool or provider if it is controlled substance.

## 2025-06-12 ENCOUNTER — MYC MEDICAL ADVICE (OUTPATIENT)
Dept: FAMILY MEDICINE | Facility: CLINIC | Age: 55
End: 2025-06-12
Payer: COMMERCIAL

## 2025-06-12 NOTE — TELEPHONE ENCOUNTER
Duplicate request. Original request routed to provider    DEENA NDIAYE RN on 6/12/2025 at 3:11 PM   [facility]